# Patient Record
Sex: FEMALE | Race: WHITE | Employment: UNEMPLOYED | URBAN - METROPOLITAN AREA
[De-identification: names, ages, dates, MRNs, and addresses within clinical notes are randomized per-mention and may not be internally consistent; named-entity substitution may affect disease eponyms.]

---

## 2017-10-02 PROBLEM — Z3A.30 30 WEEKS GESTATION OF PREGNANCY: Status: ACTIVE | Noted: 2017-10-02

## 2017-10-02 PROBLEM — O35.HXX0 CLUB FOOT, FETAL, AFFECTING CARE OF MOTHER, ANTEPARTUM: Status: ACTIVE | Noted: 2017-10-02

## 2017-10-02 PROBLEM — F41.9 ANXIETY AND DEPRESSION: Status: ACTIVE | Noted: 2017-10-02

## 2017-10-02 PROBLEM — F32.A ANXIETY AND DEPRESSION: Status: ACTIVE | Noted: 2017-10-02

## 2017-10-02 PROBLEM — O99.810 ABNORMAL GLUCOSE IN PREGNANCY, ANTEPARTUM: Status: ACTIVE | Noted: 2017-10-02

## 2017-11-15 PROBLEM — Z3A.30 30 WEEKS GESTATION OF PREGNANCY: Status: RESOLVED | Noted: 2017-10-02 | Resolved: 2017-11-15

## 2017-11-15 PROBLEM — K83.1 CHOLESTASIS DURING PREGNANCY IN THIRD TRIMESTER: Status: ACTIVE | Noted: 2017-11-15

## 2017-11-15 PROBLEM — O99.013 MATERNAL ANEMIA IN PREGNANCY, ANTEPARTUM, THIRD TRIMESTER: Status: ACTIVE | Noted: 2017-11-15

## 2017-11-15 PROBLEM — O26.613 CHOLESTASIS DURING PREGNANCY IN THIRD TRIMESTER: Status: ACTIVE | Noted: 2017-11-15

## 2017-11-15 PROBLEM — Z3A.36 36 WEEKS GESTATION OF PREGNANCY: Status: ACTIVE | Noted: 2017-11-15

## 2017-11-27 PROBLEM — O26.93 COMPLICATED PREGNANCY, THIRD TRIMESTER: Status: ACTIVE | Noted: 2017-11-27

## 2019-01-09 ENCOUNTER — HOSPITAL ENCOUNTER (EMERGENCY)
Age: 29
Discharge: HOME | End: 2019-01-09
Payer: COMMERCIAL

## 2019-01-09 VITALS
SYSTOLIC BLOOD PRESSURE: 140 MMHG | HEART RATE: 82 BPM | TEMPERATURE: 97.8 F | WEIGHT: 130 LBS | OXYGEN SATURATION: 99 % | DIASTOLIC BLOOD PRESSURE: 86 MMHG | BODY MASS INDEX: 24.55 KG/M2 | HEIGHT: 61 IN | RESPIRATION RATE: 16 BRPM

## 2019-01-09 DIAGNOSIS — L02.91 ABSCESS: Primary | ICD-10-CM

## 2019-01-09 PROCEDURE — 99282 EMERGENCY DEPT VISIT SF MDM: CPT

## 2019-01-09 RX ORDER — DOXYCYCLINE 100 MG/1
100 CAPSULE ORAL 2 TIMES DAILY
Qty: 20 CAPSULE | Refills: 0 | Status: SHIPPED | OUTPATIENT
Start: 2019-01-09 | End: 2019-01-19

## 2019-01-09 ASSESSMENT — PAIN DESCRIPTION - LOCATION: LOCATION: FACE

## 2019-01-09 ASSESSMENT — PAIN DESCRIPTION - DESCRIPTORS: DESCRIPTORS: THROBBING

## 2019-01-09 ASSESSMENT — PAIN SCALES - GENERAL: PAINLEVEL_OUTOF10: 5

## 2019-03-27 ENCOUNTER — HOSPITAL ENCOUNTER (EMERGENCY)
Age: 29
Discharge: HOME | End: 2019-03-27
Payer: COMMERCIAL

## 2019-03-27 VITALS
WEIGHT: 125 LBS | DIASTOLIC BLOOD PRESSURE: 72 MMHG | BODY MASS INDEX: 23.6 KG/M2 | TEMPERATURE: 98.2 F | HEIGHT: 61 IN | HEART RATE: 74 BPM | RESPIRATION RATE: 16 BRPM | SYSTOLIC BLOOD PRESSURE: 112 MMHG | OXYGEN SATURATION: 96 %

## 2019-03-27 DIAGNOSIS — N39.0 URINARY TRACT INFECTION WITHOUT HEMATURIA, SITE UNSPECIFIED: Primary | ICD-10-CM

## 2019-03-27 DIAGNOSIS — R30.0 DYSURIA: ICD-10-CM

## 2019-03-27 LAB
BACTERIA: ABNORMAL /HPF
BILIRUBIN URINE: NEGATIVE
BLOOD, URINE: NEGATIVE
CLARITY: NORMAL
COLOR: YELLOW
EPITHELIAL CELLS, UA: ABNORMAL /HPF
GLUCOSE URINE: NEGATIVE MG/DL
KETONES, URINE: NEGATIVE MG/DL
LEUKOCYTE ESTERASE, URINE: NEGATIVE
NITRITE, URINE: NEGATIVE
PH UA: 6.5 (ref 5–9)
PROTEIN UA: NEGATIVE MG/DL
RBC UA: ABNORMAL /HPF (ref 0–2)
SPECIFIC GRAVITY UA: 1.02 (ref 1–1.03)
UROBILINOGEN, URINE: 0.2 E.U./DL
WBC UA: ABNORMAL /HPF (ref 0–5)

## 2019-03-27 PROCEDURE — 87088 URINE BACTERIA CULTURE: CPT

## 2019-03-27 PROCEDURE — 81001 URINALYSIS AUTO W/SCOPE: CPT

## 2019-03-27 PROCEDURE — 99283 EMERGENCY DEPT VISIT LOW MDM: CPT

## 2019-03-27 PROCEDURE — 6370000000 HC RX 637 (ALT 250 FOR IP): Performed by: NURSE PRACTITIONER

## 2019-03-27 RX ORDER — PHENAZOPYRIDINE HYDROCHLORIDE 100 MG/1
200 TABLET, FILM COATED ORAL ONCE
Status: COMPLETED | OUTPATIENT
Start: 2019-03-27 | End: 2019-03-27

## 2019-03-27 RX ORDER — PHENAZOPYRIDINE HYDROCHLORIDE 100 MG/1
100 TABLET, FILM COATED ORAL 3 TIMES DAILY PRN
Qty: 9 TABLET | Refills: 0 | Status: SHIPPED | OUTPATIENT
Start: 2019-03-27 | End: 2019-03-30

## 2019-03-27 RX ORDER — CEFUROXIME AXETIL 250 MG/1
250 TABLET ORAL 2 TIMES DAILY
Qty: 20 TABLET | Refills: 0 | Status: SHIPPED | OUTPATIENT
Start: 2019-03-27 | End: 2019-04-06

## 2019-03-27 RX ORDER — NAPROXEN 500 MG/1
500 TABLET ORAL 2 TIMES DAILY
Qty: 14 TABLET | Refills: 0 | Status: SHIPPED | OUTPATIENT
Start: 2019-03-27 | End: 2019-04-03

## 2019-03-27 RX ORDER — NAPROXEN 500 MG/1
500 TABLET ORAL ONCE
Status: COMPLETED | OUTPATIENT
Start: 2019-03-27 | End: 2019-03-27

## 2019-03-27 RX ADMIN — PHENAZOPYRIDINE HYDROCHLORIDE 200 MG: 100 TABLET ORAL at 12:36

## 2019-03-27 RX ADMIN — NAPROXEN 500 MG: 500 TABLET ORAL at 12:36

## 2019-03-27 ASSESSMENT — PAIN DESCRIPTION - ORIENTATION: ORIENTATION: MID

## 2019-03-27 ASSESSMENT — PAIN SCALES - WONG BAKER: WONGBAKER_NUMERICALRESPONSE: 4;6

## 2019-03-27 ASSESSMENT — PAIN DESCRIPTION - LOCATION: LOCATION: ABDOMEN

## 2019-03-27 ASSESSMENT — PAIN DESCRIPTION - DESCRIPTORS: DESCRIPTORS: BURNING

## 2019-03-27 ASSESSMENT — PAIN SCALES - GENERAL
PAINLEVEL_OUTOF10: 5
PAINLEVEL_OUTOF10: 5

## 2019-03-27 ASSESSMENT — PAIN DESCRIPTION - PAIN TYPE: TYPE: ACUTE PAIN

## 2019-03-27 ASSESSMENT — PAIN - FUNCTIONAL ASSESSMENT: PAIN_FUNCTIONAL_ASSESSMENT: ACTIVITIES ARE NOT PREVENTED

## 2019-03-27 ASSESSMENT — PAIN DESCRIPTION - FREQUENCY: FREQUENCY: INTERMITTENT

## 2019-03-29 LAB — URINE CULTURE, ROUTINE: NORMAL

## 2019-04-17 ENCOUNTER — HOSPITAL ENCOUNTER (EMERGENCY)
Age: 29
Discharge: HOME | End: 2019-04-17
Payer: COMMERCIAL

## 2019-04-17 VITALS
DIASTOLIC BLOOD PRESSURE: 77 MMHG | OXYGEN SATURATION: 99 % | HEIGHT: 61 IN | SYSTOLIC BLOOD PRESSURE: 125 MMHG | HEART RATE: 85 BPM | BODY MASS INDEX: 23.6 KG/M2 | RESPIRATION RATE: 16 BRPM | WEIGHT: 125 LBS | TEMPERATURE: 98.3 F

## 2019-04-17 DIAGNOSIS — J06.9 VIRAL URI WITH COUGH: Primary | ICD-10-CM

## 2019-04-17 LAB
INFLUENZA A BY PCR: NOT DETECTED
INFLUENZA B BY PCR: NOT DETECTED
STREP GRP A PCR: NEGATIVE

## 2019-04-17 PROCEDURE — 87502 INFLUENZA DNA AMP PROBE: CPT

## 2019-04-17 PROCEDURE — 99283 EMERGENCY DEPT VISIT LOW MDM: CPT

## 2019-04-17 PROCEDURE — 87880 STREP A ASSAY W/OPTIC: CPT

## 2019-04-17 PROCEDURE — 6370000000 HC RX 637 (ALT 250 FOR IP): Performed by: NURSE PRACTITIONER

## 2019-04-17 RX ORDER — FLUTICASONE PROPIONATE 50 MCG
1 SPRAY, SUSPENSION (ML) NASAL DAILY
Qty: 1 BOTTLE | Refills: 0 | Status: SHIPPED | OUTPATIENT
Start: 2019-04-17 | End: 2020-08-20

## 2019-04-17 RX ORDER — IBUPROFEN 600 MG/1
600 TABLET ORAL EVERY 6 HOURS PRN
Qty: 30 TABLET | Refills: 1 | Status: SHIPPED | OUTPATIENT
Start: 2019-04-17 | End: 2021-02-16

## 2019-04-17 RX ORDER — BROMPHENIRAMINE MALEATE, PSEUDOEPHEDRINE HYDROCHLORIDE, AND DEXTROMETHORPHAN HYDROBROMIDE 2; 30; 10 MG/5ML; MG/5ML; MG/5ML
5 SYRUP ORAL 4 TIMES DAILY PRN
Qty: 120 ML | Refills: 0 | Status: SHIPPED | OUTPATIENT
Start: 2019-04-17 | End: 2019-04-22

## 2019-04-17 RX ORDER — BROMPHENIRAMINE MALEATE, PSEUDOEPHEDRINE HYDROCHLORIDE, AND DEXTROMETHORPHAN HYDROBROMIDE 2; 30; 10 MG/5ML; MG/5ML; MG/5ML
5 SYRUP ORAL ONCE
Status: COMPLETED | OUTPATIENT
Start: 2019-04-17 | End: 2019-04-17

## 2019-04-17 RX ADMIN — BROMPHENIRAMINE MALEATE, PSEUDOEPHEDRINE HYDROCHLORIDE AND DEXTROMETHORPHAN HYDROBROMIDE 5 ML: 2; 30; 10 SYRUP ORAL at 18:12

## 2019-04-17 NOTE — ED PROVIDER NOTES
Department of Emergency Medicine   ED  Provider Note  Admit Date/RoomTime: 4/17/2019  5:14 PM  ED Room: 19/19  Chief Complaint:   URI    History of Present Illness   Source of history provided by:  patient. History/Exam Limitations: none. Steffi Owens is a 33 y.o. old female with a past medical history of:   Past Medical History:   Diagnosis Date    ADHD (attention deficit hyperactivity disorder)     Cholestasis during pregnancy in third trimester 11/15/2017    Depression     Diabetes mellitus ([facility] [address]     Maternal anemia in pregnancy, antepartum, third trimester 11/15/2017    presents to the emergency department by private vehicle, for fever, chills, nasal congestion and cough, which began 2 day(s) prior to arrival.  Since onset the symptoms have been persistent and mild-moderate in severity. The symptoms are associated with appetite decrease, earache and muscle aches. There has been NO abdominal pain, chest pain, diarrhea, dysuria, nausea, swollen glands or vomiting. ROS   Pertinent positives and negatives are stated within HPI, all other systems reviewed and are negative. Past Surgical History:  has no past surgical history on file. Social History:  reports that she has never smoked. She has never used smokeless tobacco. She reports that she does not drink alcohol or use drugs. Family History: family history is not on file. Allergies: Patient has no known allergies. Physical Exam           ED Triage Vitals [04/17/19 1712]   BP Temp Temp Source Pulse Resp SpO2 Height Weight   125/77 98.3 °F (36.8 °C) Oral 85 16 99 % 5' 1\" (1.549 m) 125 lb (56.7 kg)      Oxygen Saturation Interpretation: Normal.    · Constitutional:  Alert, development consistent with age. · Ears:  External Ears: Bilateral normal.               TM's & External Canals: normal TMs bilaterally. No bulging or erythema. · Nose:   There is clear rhinorrhea and no sinus tenderness.   · Sinuses: no Bilateral maxillary sinus tenderness. no Bilateral frontal sinus tenderness. · Mouth:  normal tongue and buccal mucosa. · Throat: mild erythema and 1-2+ tonsillar enlargement. No tonsillar concretions. No uvular deviation. Airway Patent. · Neck:  Supple. There is no  preauricular, submental, anterior cervical, posterior cervical and supraclavicular node tenderness. · Respiratory:   Breath sounds: Bilateral normal.  Lung sounds: clear without wheezing, rales, or rhonchi. · CV:  Regular rate and rhythm, normal heart sounds, without pathological murmurs, ectopy, gallops, or rubs. · GI:  Abdomen Soft, nontender, good bowel sounds. No firm or pulsatile mass. · Integument:  Normal turgor. Warm, dry, without visible rash. · Neurological:  Oriented. Motor functions intact. Lab / Imaging Results   (All laboratory and radiology results have been personally reviewed by myself)  Labs:  Results for orders placed or performed during the hospital encounter of 04/17/19   RAPID INFLUENZA A/B ANTIGENS   Result Value Ref Range    Influenza A by PCR Not Detected Not Detected    Influenza B by PCR Not Detected Not Detected   Strep Screen Group A Throat   Result Value Ref Range    Strep Grp A PCR Negative Negative       Imaging: All Radiology results interpreted by Radiologist unless otherwise noted. No orders to display       ED Course / Medical Decision Making     Medications   brompheniramine-pseudoephedrine-DM syrup 5 mL (5 mLs Oral Given 4/17/19 1812)     ED Course as of Apr 17 1844   Wed Apr 17, 2019   1825 Patient seen and reexamined the bedside. She missed to the flulike illness for the last 48 hours or so. Both other individuals in her house are sick with similar symptoms but all are admittedly mild. She has some nasal congestion, sinus congestion but no pain, postnasal drip and mild sore throat. She has no chest pain, has cough with scant sputum production but denies hemoptysis. Patient's symptoms are improving. Results are explained to patient in plain language, treatment plan reviewed, patient is agreeable with plan for discharge at this point. Understands that will need to call to arrange follow-up. [ZD]   1826 Oxygen Saturation Interpretation: Normal    Constitutional:  Alert, Well hydrated and well nourish, mildly ill-appearing, non-toxic and without distress. Eyes:  PERRL, EOMI, no discharge or conjunctival injection. Ears:  TMs without perforation, injection, or bulging. External canals clear without exudate. Nose:  There is mild mucosal erythema and clear nasal discharge, and no swelling or lesions present. Mouth:  Mucous membranes moist without lesions, tongue and gums normal.  Throat:  Pharynx without injection, exudate, or tonsillar hypertrophy. Uvula midline without edema or erythema. Airway patient. Neck:  Supple. No lymphadenopathy. No meningismus. Respiratory:  Clear to auscultation and breath sounds equal. Air entry normal.   CV:  Regular rate and rhythm, no murmer, gallups or rubs. .  GI:  Abdomen Soft, nontender, +BS. No rebound, guarding or rigidity, Suprapubic and CVA regions are nontender. Integument:  Normal turgor. Warm, dry, without visible rash, unless noted elsewhere. Lymphatic: no lymphadenopathy noted  Neurological:  Orientation age-appropriate unless noted elseware. Motor functions intact. [ZD]      ED Course User Index  [ZD] TAWANDA Richards Ala, CNP     Re-examination:  4/17/19       Time: 6:30 pm   Patient seen and reexamined the bedside. Patients symptoms are improving. Results are explained to patient in plain language, treatment plan reviewed, patient is agreeable with plan for discharge at this point. Understands that will need to call to arrange follow-up. Consults:   None    Procedures:   none    Medical Decision Making:    Based on low suspicion for pneumonia as per history/physical findings, imaging was not done.   Upper respiratory infection is likely to

## 2019-05-15 ENCOUNTER — HOSPITAL ENCOUNTER (EMERGENCY)
Age: 29
Discharge: HOME | End: 2019-05-15
Payer: COMMERCIAL

## 2019-05-15 VITALS
DIASTOLIC BLOOD PRESSURE: 60 MMHG | HEIGHT: 61 IN | OXYGEN SATURATION: 100 % | TEMPERATURE: 98.2 F | WEIGHT: 120 LBS | SYSTOLIC BLOOD PRESSURE: 164 MMHG | BODY MASS INDEX: 22.66 KG/M2 | HEART RATE: 107 BPM | RESPIRATION RATE: 16 BRPM

## 2019-05-15 DIAGNOSIS — R19.7 NAUSEA VOMITING AND DIARRHEA: Primary | ICD-10-CM

## 2019-05-15 DIAGNOSIS — R11.2 NAUSEA VOMITING AND DIARRHEA: Primary | ICD-10-CM

## 2019-05-15 LAB
ALBUMIN SERPL-MCNC: 4.3 G/DL (ref 3.5–5.2)
ALP BLD-CCNC: 57 U/L (ref 35–104)
ALT SERPL-CCNC: 18 U/L (ref 0–32)
ANION GAP SERPL CALCULATED.3IONS-SCNC: 12 MMOL/L (ref 7–16)
AST SERPL-CCNC: 27 U/L (ref 0–31)
BACTERIA: ABNORMAL /HPF
BASOPHILS ABSOLUTE: 0.02 E9/L (ref 0–0.2)
BASOPHILS RELATIVE PERCENT: 0.3 % (ref 0–2)
BILIRUB SERPL-MCNC: 0.4 MG/DL (ref 0–1.2)
BILIRUBIN URINE: NEGATIVE
BLOOD, URINE: ABNORMAL
BUN BLDV-MCNC: 9 MG/DL (ref 6–20)
CALCIUM SERPL-MCNC: 9.1 MG/DL (ref 8.6–10.2)
CHLORIDE BLD-SCNC: 102 MMOL/L (ref 98–107)
CLARITY: ABNORMAL
CO2: 21 MMOL/L (ref 22–29)
COLOR: YELLOW
CREAT SERPL-MCNC: 0.8 MG/DL (ref 0.5–1)
EOSINOPHILS ABSOLUTE: 0.03 E9/L (ref 0.05–0.5)
EOSINOPHILS RELATIVE PERCENT: 0.4 % (ref 0–6)
EPITHELIAL CELLS, UA: ABNORMAL /HPF
GFR AFRICAN AMERICAN: >60
GFR NON-AFRICAN AMERICAN: >60 ML/MIN/1.73
GLUCOSE BLD-MCNC: 100 MG/DL (ref 74–99)
GLUCOSE URINE: NEGATIVE MG/DL
HCG, URINE, POC: NEGATIVE
HCT VFR BLD CALC: 39.7 % (ref 34–48)
HEMOGLOBIN: 12.7 G/DL (ref 11.5–15.5)
IMMATURE GRANULOCYTES #: 0.02 E9/L
IMMATURE GRANULOCYTES %: 0.3 % (ref 0–5)
INFLUENZA A BY PCR: NOT DETECTED
INFLUENZA B BY PCR: NOT DETECTED
KETONES, URINE: ABNORMAL MG/DL
LACTIC ACID: 0.7 MMOL/L (ref 0.5–2.2)
LEUKOCYTE ESTERASE, URINE: NEGATIVE
LIPASE: 19 U/L (ref 13–60)
LYMPHOCYTES ABSOLUTE: 0.71 E9/L (ref 1.5–4)
LYMPHOCYTES RELATIVE PERCENT: 9.4 % (ref 20–42)
Lab: NORMAL
MCH RBC QN AUTO: 29.6 PG (ref 26–35)
MCHC RBC AUTO-ENTMCNC: 32 % (ref 32–34.5)
MCV RBC AUTO: 92.5 FL (ref 80–99.9)
MONOCYTES ABSOLUTE: 0.94 E9/L (ref 0.1–0.95)
MONOCYTES RELATIVE PERCENT: 12.4 % (ref 2–12)
NEGATIVE QC PASS/FAIL: NORMAL
NEUTROPHILS ABSOLUTE: 5.84 E9/L (ref 1.8–7.3)
NEUTROPHILS RELATIVE PERCENT: 77.2 % (ref 43–80)
NITRITE, URINE: NEGATIVE
PDW BLD-RTO: 13.1 FL (ref 11.5–15)
PH UA: 6 (ref 5–9)
PLATELET # BLD: 221 E9/L (ref 130–450)
PMV BLD AUTO: 10.4 FL (ref 7–12)
POSITIVE QC PASS/FAIL: NORMAL
POTASSIUM SERPL-SCNC: 4.1 MMOL/L (ref 3.5–5)
PROTEIN UA: 30 MG/DL
RBC # BLD: 4.29 E12/L (ref 3.5–5.5)
RBC UA: ABNORMAL /HPF (ref 0–2)
SODIUM BLD-SCNC: 135 MMOL/L (ref 132–146)
SPECIFIC GRAVITY UA: >=1.03 (ref 1–1.03)
TOTAL PROTEIN: 7.6 G/DL (ref 6.4–8.3)
UROBILINOGEN, URINE: 0.2 E.U./DL
WBC # BLD: 7.6 E9/L (ref 4.5–11.5)
WBC UA: ABNORMAL /HPF (ref 0–5)

## 2019-05-15 PROCEDURE — 99284 EMERGENCY DEPT VISIT MOD MDM: CPT

## 2019-05-15 PROCEDURE — 83605 ASSAY OF LACTIC ACID: CPT

## 2019-05-15 PROCEDURE — 83690 ASSAY OF LIPASE: CPT

## 2019-05-15 PROCEDURE — 85025 COMPLETE CBC W/AUTO DIFF WBC: CPT

## 2019-05-15 PROCEDURE — 6370000000 HC RX 637 (ALT 250 FOR IP): Performed by: PHYSICIAN ASSISTANT

## 2019-05-15 PROCEDURE — 80053 COMPREHEN METABOLIC PANEL: CPT

## 2019-05-15 PROCEDURE — 87502 INFLUENZA DNA AMP PROBE: CPT

## 2019-05-15 PROCEDURE — 81001 URINALYSIS AUTO W/SCOPE: CPT

## 2019-05-15 PROCEDURE — 36415 COLL VENOUS BLD VENIPUNCTURE: CPT

## 2019-05-15 RX ORDER — DICYCLOMINE HYDROCHLORIDE 10 MG/1
20 CAPSULE ORAL
Qty: 15 CAPSULE | Refills: 0 | Status: SHIPPED | OUTPATIENT
Start: 2019-05-15 | End: 2021-02-16

## 2019-05-15 RX ORDER — IBUPROFEN 600 MG/1
600 TABLET ORAL ONCE
Status: COMPLETED | OUTPATIENT
Start: 2019-05-15 | End: 2019-05-15

## 2019-05-15 RX ORDER — DICYCLOMINE HYDROCHLORIDE 10 MG/1
20 CAPSULE ORAL ONCE
Status: COMPLETED | OUTPATIENT
Start: 2019-05-15 | End: 2019-05-15

## 2019-05-15 RX ORDER — KETOROLAC TROMETHAMINE 30 MG/ML
30 INJECTION, SOLUTION INTRAMUSCULAR; INTRAVENOUS ONCE
Status: DISCONTINUED | OUTPATIENT
Start: 2019-05-15 | End: 2019-05-15

## 2019-05-15 RX ORDER — ONDANSETRON 4 MG/1
4 TABLET, ORALLY DISINTEGRATING ORAL ONCE
Status: COMPLETED | OUTPATIENT
Start: 2019-05-15 | End: 2019-05-15

## 2019-05-15 RX ORDER — ONDANSETRON 2 MG/ML
4 INJECTION INTRAMUSCULAR; INTRAVENOUS ONCE
Status: DISCONTINUED | OUTPATIENT
Start: 2019-05-15 | End: 2019-05-15

## 2019-05-15 RX ORDER — ONDANSETRON 4 MG/1
4 TABLET, ORALLY DISINTEGRATING ORAL EVERY 8 HOURS PRN
Qty: 10 TABLET | Refills: 0 | Status: SHIPPED | OUTPATIENT
Start: 2019-05-15 | End: 2020-08-20

## 2019-05-15 RX ORDER — 0.9 % SODIUM CHLORIDE 0.9 %
1000 INTRAVENOUS SOLUTION INTRAVENOUS ONCE
Status: DISCONTINUED | OUTPATIENT
Start: 2019-05-15 | End: 2019-05-15

## 2019-05-15 RX ADMIN — ONDANSETRON 4 MG: 4 TABLET, ORALLY DISINTEGRATING ORAL at 19:44

## 2019-05-15 RX ADMIN — DICYCLOMINE HYDROCHLORIDE 20 MG: 10 CAPSULE ORAL at 19:44

## 2019-05-15 RX ADMIN — IBUPROFEN 600 MG: 600 TABLET ORAL at 20:29

## 2019-05-15 ASSESSMENT — PAIN DESCRIPTION - PAIN TYPE: TYPE: ACUTE PAIN

## 2019-05-15 ASSESSMENT — PAIN SCALES - GENERAL: PAINLEVEL_OUTOF10: 5

## 2019-05-15 ASSESSMENT — PAIN DESCRIPTION - LOCATION: LOCATION: OTHER (COMMENT)

## 2019-05-15 NOTE — ED PROVIDER NOTES
[facility]  HPI:  5/15/19, Time: 7:00 PM         Norbetro Raya is a 33 y.o. female presenting to the ED for vomiting and diarrhea beginning 1 day  ago. The complaint has been persistent, moderate in severity, and worsened by nothing. Patient comes in with complaint of nausea vomiting diarrhea. She states she started with nausea vomiting yesterday had about 5 episodes today and she states she's had nausea but no vomiting has persistent diarrhea of watery diarrhea. She states she gets intermittent abdominal cramping prior to having diarrhea denies any fever chills. Denies any urinary frequency urgency burning. Patient's daughter was recently treated for vomiting diarrhea. Review of Systems:   Pertinent positives and negatives are stated within HPI, all other systems reviewed and are negative.          --------------------------------------------- PAST HISTORY ---------------------------------------------  Past Medical History:  has a past medical history of ADHD (attention deficit hyperactivity disorder), Cholestasis during pregnancy in third trimester, Depression, Diabetes mellitus ([facility] [address] and Maternal anemia in pregnancy, antepartum, third trimester. Past Surgical History:  has no past surgical history on file. Social History:  reports that she has never smoked. She has never used smokeless tobacco. She reports that she does not drink alcohol or use drugs. Family History: family history is not on file. The patients home medications have been reviewed. Allergies: Patient has no known allergies.     -------------------------------------------------- RESULTS -------------------------------------------------  All laboratory and radiology results have been personally reviewed by myself   LABS:  Results for orders placed or performed during the hospital encounter of 05/15/19   Rapid influenza A/B antigens   Result Value Ref Range    Influenza A by PCR Not Detected Not Detected    Influenza B by PCR tablet 600 mg (600 mg Oral Given 5/15/19 2029)         ED COURSE:     1920 patient refused IV. 2010 patient refuses CT abdomen. Medical Decision Making:    Vision came in with complaint of nausea vomiting diarrhea started yesterday. She has a daughter at home with similar symptoms. Patient's lab work showed normal white cell counts, normal electrolytes. Lactic acid is normal she states she gets some abdominal discomfort prior to the diarrhea. On exam she had mild suprapubic tenderness. Patient refused IV CT here during evaluation. Discussed with patient possibility of colitis. She had no right lower quadrant tenderness no guarding or rebound normal white cell count with no fever potential diagnosis of appendicitis is low. She states that she will return to the ER if symptoms worsen or persist. She was discharged with Bentyl Zofran. Patient slightly tachycardic. She refused IV fluids here on admission and urine showed trace ketones. Tachycardia likely due to dehydration. She is is tolerating oral hydration. Counseling: The emergency provider has spoken with the patient and discussed todays results, in addition to providing specific details for the plan of care and counseling regarding the diagnosis and prognosis. Questions are answered at this time and they are agreeable with the plan.      --------------------------------- IMPRESSION AND DISPOSITION ---------------------------------    IMPRESSION  1. Nausea vomiting and diarrhea        DISPOSITION  Disposition: Discharge to home  Patient condition is good      NOTE: This report was transcribed using voice recognition software.  Every effort was made to ensure accuracy; however, inadvertent computerized transcription errors may be present     Gwendolyn Bro, [address]  05/15/19 2654

## 2020-08-20 ENCOUNTER — HOSPITAL ENCOUNTER (EMERGENCY)
Age: 30
Discharge: HOME | End: 2020-08-20
Attending: FAMILY MEDICINE
Payer: COMMERCIAL

## 2020-08-20 VITALS
OXYGEN SATURATION: 98 % | WEIGHT: 135 LBS | TEMPERATURE: 97 F | BODY MASS INDEX: 25.51 KG/M2 | RESPIRATION RATE: 20 BRPM | HEART RATE: 130 BPM | DIASTOLIC BLOOD PRESSURE: 77 MMHG | SYSTOLIC BLOOD PRESSURE: 140 MMHG

## 2020-08-20 PROCEDURE — G0381 LEV 2 HOSP TYPE B ED VISIT: HCPCS

## 2020-08-20 RX ORDER — BROMPHENIRAMINE MALEATE, PSEUDOEPHEDRINE HYDROCHLORIDE, AND DEXTROMETHORPHAN HYDROBROMIDE 2; 30; 10 MG/5ML; MG/5ML; MG/5ML
5 SYRUP ORAL 4 TIMES DAILY PRN
Qty: 90 ML | Refills: 0 | Status: SHIPPED | OUTPATIENT
Start: 2020-08-20 | End: 2020-12-20

## 2020-08-20 RX ORDER — AMOXICILLIN 500 MG/1
500 CAPSULE ORAL 3 TIMES DAILY
Qty: 30 CAPSULE | Refills: 0 | Status: SHIPPED | OUTPATIENT
Start: 2020-08-20 | End: 2020-08-30

## 2020-08-20 NOTE — ED PROVIDER NOTES
HPI:  8/20/20,   Time: 6:17 PM EDT         Maryjane Carrasco is a 30 y.o. female presenting to the ED for a 2-week history of nasal congestion, yellow nasal discharge, facial pressure and nonproductive cough. She denies fever chills or body aches. She complains of bilateral ear pressure and popping. ROS:   Pertinent positives and negatives are stated within HPI, all other systems reviewed and are negative.  --------------------------------------------- PAST HISTORY ---------------------------------------------  Past Medical History:  has a past medical history of ADHD (attention deficit hyperactivity disorder), Cholestasis during pregnancy in third trimester, Depression, Diabetes mellitus ([facility] [address] and Maternal anemia in pregnancy, antepartum, third trimester. Past Surgical History:  has no past surgical history on file. Social History:  reports that she has never smoked. She has never used smokeless tobacco. She reports that she does not drink alcohol or use drugs. Family History: family history is not on file. The patients home medications have been reviewed. Allergies: Patient has no known allergies. -------------------------------------------------- RESULTS -------------------------------------------------  All laboratory and radiology results have been personally reviewed by myself   LABS:  No results found for this visit on 08/20/20. RADIOLOGY:  Interpreted by Radiologist.  No orders to display       ------------------------- NURSING NOTES AND VITALS REVIEWED ---------------------------   The nursing notes within the ED encounter and vital signs as below have been reviewed.    BP (!) 140/77   Pulse 130   Temp 97 °F (36.1 °C) (Skin)   Resp 20   Wt 135 lb (61.2 kg)   SpO2 98%   BMI 25.51 kg/m²   Oxygen Saturation Interpretation: Normal      ---------------------------------------------------PHYSICAL EXAM--------------------------------------    Constitutional/General: Alert and

## 2020-08-21 ENCOUNTER — CARE COORDINATION (OUTPATIENT)
Dept: CARE COORDINATION | Age: 30
End: 2020-08-21

## 2020-08-21 NOTE — CARE COORDINATION
ACM attempted to contact patient as a follow up to her ER visit on 08/20/20. ACM left a HIPAA compliant voice message with contact information asking patient to return the call.      PLAN:  D/T unable to contact patient, ACM will resolve COVID 19 Monitoring episode and remove name from the care team.

## 2020-12-20 ENCOUNTER — HOSPITAL ENCOUNTER (EMERGENCY)
Age: 30
Discharge: HOME | End: 2020-12-20
Payer: COMMERCIAL

## 2020-12-20 VITALS
SYSTOLIC BLOOD PRESSURE: 121 MMHG | TEMPERATURE: 98.2 F | OXYGEN SATURATION: 100 % | BODY MASS INDEX: 24.56 KG/M2 | DIASTOLIC BLOOD PRESSURE: 81 MMHG | WEIGHT: 130 LBS | RESPIRATION RATE: 16 BRPM | HEART RATE: 91 BPM

## 2020-12-20 PROCEDURE — 99212 OFFICE O/P EST SF 10 MIN: CPT

## 2020-12-20 RX ORDER — LEVONORGESTREL AND ETHINYL ESTRADIOL 0.15-0.03
1 KIT ORAL DAILY
COMMUNITY

## 2020-12-20 RX ORDER — AZITHROMYCIN 250 MG/1
TABLET, FILM COATED ORAL
Qty: 6 TABLET | Refills: 0 | Status: SHIPPED | OUTPATIENT
Start: 2020-12-20 | End: 2020-12-30

## 2020-12-20 RX ORDER — BROMPHENIRAMINE MALEATE, PSEUDOEPHEDRINE HYDROCHLORIDE, AND DEXTROMETHORPHAN HYDROBROMIDE 2; 30; 10 MG/5ML; MG/5ML; MG/5ML
5 SYRUP ORAL 4 TIMES DAILY PRN
Qty: 140 ML | Refills: 0 | Status: SHIPPED | OUTPATIENT
Start: 2020-12-20 | End: 2020-12-27

## 2020-12-20 ASSESSMENT — PAIN DESCRIPTION - DESCRIPTORS: DESCRIPTORS: SORE

## 2020-12-20 ASSESSMENT — PAIN DESCRIPTION - LOCATION: LOCATION: THROAT

## 2020-12-20 ASSESSMENT — PAIN SCALES - GENERAL: PAINLEVEL_OUTOF10: 5

## 2020-12-21 NOTE — ED PROVIDER NOTES
Department of Emergency [facility]  Provider Note  Admit Date/RoomTime: 12/20/2020  6:57 PM  Room: 04/04  Chief Complaint   Cough (productive cough x 1 week, chest hurts after coughing) and Pharyngitis (throat feels sore)    History of Present Illness   Source of history provided by:  Patient. History/Exam Limitations: None. Clive Eller is a 30 y.o. female with no significant medical history. She reports a 1 week history of nonproductive cough, congestion, sore throat, rhinorrhea, and malaise. Denies any chest pain. No shortness of breath. Daughter is ill with similar symptoms though apparently had otitis media. Denies any fevers, chills, or rigors. Denies any hemoptysis. Denies any abdominal pain or vomiting. No history of underlying cardiopulmonary disease. No exposures to any individuals diagnosed with COVID-19 or PUIs. No high risk domestic or international travel. Does not live or work in a group/institutionalized setting. ROS    Pertinent positives and negatives are stated within HPI, all other systems reviewed and are negative. History reviewed. No pertinent surgical history. Social History:  reports that she has never smoked. She has never used smokeless tobacco. She reports that she does not drink alcohol or use drugs. Family History: family history is not on file. Allergies: Patient has no known allergies. Physical Exam            ED Triage Vitals [12/20/20 1858]   BP Temp Temp Source Pulse Resp SpO2 Height Weight   121/81 98.2 °F (36.8 °C) Infrared 91 16 100 % -- 130 lb (59 kg)      Oxygen Saturation Interpretation: Normal.    Gen.: Vitals noted no distress. Afebrile. Normal phonation, no stridor, no trismus. Not hypoxemic. Clinically well-appearing. ENT: Appears well-hydrated. Posterior oropharynx is unremarkable. No exudate or asymmetry. Neck: Supple. No meningismus through full range of motion.  No anterior cervical or submandibular lymphadenopathy. No posterior lymphadenopathy. Cardiac: Regular rate rhythm no murmur. Lungs: Good aeration throughout. No adventitious breath sounds. Abdomen: Soft, nontender, nonsurgical throughout. Normoactive bowel sounds. Extremities: No peripheral edema, negative Homans bilaterally no cords. Skin: No rash. Neuro: No gross neurologic deficits. Lab / Imaging Results   (All laboratory and radiology results have been personally reviewed by myself)  Labs:  No results found for this visit on 12/20/20. Imaging: All Radiology results interpreted by Radiologist unless otherwise noted. No orders to display     ED Course / Medical Decision Making   Medications - No data to display       Differential Diagnosis: Is extensive but includes viral URI, COVID-19, community-acquired pneumonia, sepsis, cardiomyopathy, etc.    MDM:     This is a 30 y.o. female who presents with the above history. The patient has an unremarkable physical exam.  Centor score is 0/4. Agrees imaging is not indicated. Will be given Zithromax due to her longevity of symptoms as well as Bromfed. Discussed the very limited ability to obtain Covid testing at this facility. Is given a list of local testing centers where this can be obtained. Discussed at home quarantine and social distancing, etc. until resulted. Discussed appropriate return to work restrictions. The patient patient is clinically very well-appearing and nontoxic with no evidence of acute cardiopulmonary compromise. She notes that she is supposed to travel to [place] in the next few days and I advised her she should not do this until she is tested for Covid. Plan of Care: Normal progression of disease discussed. All questions answered. Explained symptomatic treatment. Instruction provided in the use of fluids, vaporizer, acetaminophen, and other OTC medication for symptom control. Extra fluids  Analgesics as needed, dose reviewed.   Follow up as needed should symptoms fail to improve. Counseling: Homegoing. I discussed the differential, results and discharge plan with the patient and/or family/friend/caregiver if present. I emphasized the importance of follow-up with the physician I referred them to in the timeframe recommended. I explained reasons for the patient to return to the Emergency Department. Additional verbal discharge instructions were also given and discussed with the patient to supplement those generated by the EMR. We also discussed medications that were prescribed (if any) including common side effects and interactions. The patient was advised to abstain from driving, operating heavy machinery or making significant decisions while taking medications such as opiates and muscle relaxers that may impair this. All questions were addressed. They understand return precautions and discharge instructions. The patient and/or family/friend/caregiver expressed understanding. Assessment      1. Acute upper respiratory infection      Plan   Discharge to home and advised to contact Vikram Humphrey MD  [address]       As needed   Patient condition is good    New Medications     New Prescriptions    AZITHROMYCIN (ZITHROMAX Z-CHESTER) 250 MG TABLET    Take 2 tabs on day one, followed by 1 tablet daily for 4 days. BROMPHENIRAMINE-PSEUDOEPHEDRINE-DM 2-30-10 MG/5ML SYRUP    Take 5 mLs by mouth 4 times daily as needed for Congestion or Cough     Electronically signed by MICHAEL Bernardo   DD: 12/20/20  **This report was transcribed using voice recognition software. Every effort was made to ensure accuracy; however, inadvertent computerized transcription errors may be present.   END OF ED PROVIDER NOTE        Isael Haskins [address] [place], [place]  12/20/20 2718

## 2021-02-16 ENCOUNTER — OFFICE VISIT (OUTPATIENT)
Dept: FAMILY MEDICINE CLINIC | Age: 31
End: 2021-02-16
Payer: COMMERCIAL

## 2021-02-16 VITALS
WEIGHT: 127 LBS | OXYGEN SATURATION: 98 % | DIASTOLIC BLOOD PRESSURE: 70 MMHG | SYSTOLIC BLOOD PRESSURE: 110 MMHG | TEMPERATURE: 98.7 F | HEART RATE: 90 BPM | BODY MASS INDEX: 23.98 KG/M2 | HEIGHT: 61 IN | RESPIRATION RATE: 16 BRPM

## 2021-02-16 DIAGNOSIS — Z86.32 HISTORY OF GESTATIONAL DIABETES: ICD-10-CM

## 2021-02-16 DIAGNOSIS — F31.9 BIPOLAR DEPRESSION (HCC): ICD-10-CM

## 2021-02-16 DIAGNOSIS — Z76.89 ENCOUNTER TO ESTABLISH CARE: Primary | ICD-10-CM

## 2021-02-16 DIAGNOSIS — F41.9 ANXIETY: ICD-10-CM

## 2021-02-16 PROBLEM — O35.HXX0 CLUB FOOT, FETAL, AFFECTING CARE OF MOTHER, ANTEPARTUM: Status: RESOLVED | Noted: 2017-10-02 | Resolved: 2021-02-16

## 2021-02-16 PROBLEM — K83.1 CHOLESTASIS DURING PREGNANCY IN THIRD TRIMESTER: Status: RESOLVED | Noted: 2017-11-15 | Resolved: 2021-02-16

## 2021-02-16 PROBLEM — O26.93 COMPLICATED PREGNANCY, THIRD TRIMESTER: Status: RESOLVED | Noted: 2017-11-27 | Resolved: 2021-02-16

## 2021-02-16 PROBLEM — O99.013 MATERNAL ANEMIA IN PREGNANCY, ANTEPARTUM, THIRD TRIMESTER: Status: RESOLVED | Noted: 2017-11-15 | Resolved: 2021-02-16

## 2021-02-16 PROBLEM — O99.810 ABNORMAL GLUCOSE IN PREGNANCY, ANTEPARTUM: Status: RESOLVED | Noted: 2017-10-02 | Resolved: 2021-02-16

## 2021-02-16 PROBLEM — O26.613 CHOLESTASIS DURING PREGNANCY IN THIRD TRIMESTER: Status: RESOLVED | Noted: 2017-11-15 | Resolved: 2021-02-16

## 2021-02-16 PROBLEM — Z3A.36 36 WEEKS GESTATION OF PREGNANCY: Status: RESOLVED | Noted: 2017-11-15 | Resolved: 2021-02-16

## 2021-02-16 PROCEDURE — 1036F TOBACCO NON-USER: CPT | Performed by: FAMILY MEDICINE

## 2021-02-16 PROCEDURE — G8420 CALC BMI NORM PARAMETERS: HCPCS | Performed by: FAMILY MEDICINE

## 2021-02-16 PROCEDURE — G8427 DOCREV CUR MEDS BY ELIG CLIN: HCPCS | Performed by: FAMILY MEDICINE

## 2021-02-16 PROCEDURE — 99203 OFFICE O/P NEW LOW 30 MIN: CPT | Performed by: FAMILY MEDICINE

## 2021-02-16 PROCEDURE — G8484 FLU IMMUNIZE NO ADMIN: HCPCS | Performed by: FAMILY MEDICINE

## 2021-02-16 ASSESSMENT — ENCOUNTER SYMPTOMS
SINUS PAIN: 0
DIARRHEA: 0
BACK PAIN: 0
SHORTNESS OF BREATH: 0
ABDOMINAL PAIN: 0
COUGH: 0
VOMITING: 0
RHINORRHEA: 0
CONSTIPATION: 0
SORE THROAT: 0
NAUSEA: 0

## 2021-02-16 NOTE — PROGRESS NOTES
2021    Chief Complaint   Patient presents with    New Patient     hasnt seen pcp in years per patient. was seeing [facility] at one time for bipolar. not currently seeing any specialist at present time    [address] recent pap a few weeks ago        Silvano Jaramillo (:  1990) is a 35 y.o. female, here for establishing care. They report a PMH of:  Past Medical History:   Diagnosis Date    ADHD (attention deficit hyperactivity disorder)     Cholestasis during pregnancy in third trimester 11/15/2017    Depression     DM (diabetes mellitus), gestational     Maternal anemia in pregnancy, antepartum, third trimester 11/15/2017     Social and family histories reviewed and updated as appropriate. She was not previously following with a PCP   She also follows with OB/GYN  She has previously seen psychiatry [facility] counseling)    Her only current medication is OCPs     She donates plasma twice weekly     She does have history history significant for gestational diabetes (diet-controlled)     She also reports a history of bipolar depression/anxiety for which she was previously seeing psychiatry. Previous treatment included medication and counseling    Overall, she feels well    Review of Systems   Constitutional: Negative for chills, fatigue and fever. HENT: Negative for congestion, rhinorrhea, sinus pain and sore throat. Respiratory: Negative for cough and shortness of breath. Cardiovascular: Negative for chest pain, palpitations and leg swelling. Gastrointestinal: Negative for abdominal pain, constipation, diarrhea, nausea and vomiting. Genitourinary: Negative for difficulty urinating. Musculoskeletal: Negative for arthralgias, back pain and gait problem. Skin: Negative for rash. Allergic/Immunologic: Negative for environmental allergies and food allergies. Neurological: Negative for dizziness, weakness and numbness.    Hematological: Does not bruise/bleed easily. Psychiatric/Behavioral: Negative for dysphoric mood. The patient is not nervous/anxious. Prior to Visit Medications    Medication Sig Taking? Authorizing Provider   levonorgestrel-ethinyl estradiol (SEASONALE) 0.15-0.03 MG per tablet Take 1 tablet by mouth daily Yes Historical Provider, MD        No Known Allergies    No past surgical history on file.     Social History     Socioeconomic History    Marital status: Single     Spouse name: Not on file    Number of children: Not on file    Years of education: Not on file    Highest education level: Not on file   Occupational History    Not on file   Social Needs    Financial resource strain: Not on file    Food insecurity     Worry: Not on file     Inability: Not on file    Transportation needs     Medical: Not on file     Non-medical: Not on file   Tobacco Use    Smoking status: Never Smoker    Smokeless tobacco: Never Used   Substance and Sexual Activity    Alcohol use: No    Drug use: No    Sexual activity: Yes     Partners: Male   Lifestyle    Physical activity     Days per week: Not on file     Minutes per session: Not on file    Stress: Not on file   Relationships    Social connections     Talks on phone: Not on file     Gets together: Not on file     Attends Gnosticist service: Not on file     Active member of club or organization: Not on file     Attends meetings of clubs or organizations: Not on file     Relationship status: Not on file    Intimate partner violence     Fear of current or ex partner: Not on file     Emotionally abused: Not on file     Physically abused: Not on file     Forced sexual activity: Not on file   Other Topics Concern    Not on file   Social History Narrative    Not on file        Family History   Problem Relation Age of Onset    High Blood Pressure Mother    Sherrine Temple Disease Mother     Cancer Father         lung and brain cancer age of death 64     Other Brother         alcoholic  Diabetes Maternal Aunt        Vitals:    02/16/21 1437   BP: 110/70   Pulse: 90   Resp: 16   Temp: 98.7 °F (37.1 °C)   TempSrc: Temporal   SpO2: 98%   Weight: 127 lb (57.6 kg)   Height: 5' 1\" (1.549 m)     Estimated body mass index is 24 kg/m² as calculated from the following:    Height as of this encounter: 5' 1\" (1.549 m). Weight as of this encounter: 127 lb (57.6 kg). Physical Exam  Constitutional:       General: She is not in acute distress. Appearance: She is well-developed. HENT:      Head: Normocephalic and atraumatic. Right Ear: External ear normal.      Left Ear: External ear normal.   Eyes:      Extraocular Movements: Extraocular movements intact. Pupils: Pupils are equal, round, and reactive to light. Neck:      Musculoskeletal: Normal range of motion. Thyroid: No thyromegaly. Cardiovascular:      Rate and Rhythm: Normal rate and regular rhythm. Pulmonary:      Effort: Pulmonary effort is normal. No respiratory distress. Breath sounds: Normal breath sounds. No wheezing or rales. Abdominal:      General: There is no distension. Musculoskeletal: Normal range of motion. General: No swelling or deformity. Skin:     General: Skin is warm. Findings: No rash. Neurological:      General: No focal deficit present. Mental Status: She is alert. Mental status is at baseline. Psychiatric:         Mood and Affect: Mood is anxious. Speech: Speech is tangential.         Behavior: Behavior is cooperative. ASSESSMENT/PLAN:  Brice Fitzgerald was seen today for new patient and health maintenance. Diagnoses and all orders for this visit:    Encounter to establish care    Anxiety    B[facility] ([facility]    History of gestational diabetes  -     CBC Auto Differential; Future  -     Comprehensive Metabolic Panel; Future  -     TSH; Future  -     HEMOGLOBIN A1C; Future    Additional plan and future considerations:   As above.   Records request. Encourage follow-up with psychiatry. Will notify of lab results via [address]   Return to office pending labs      --Marcelino Grant, DO on 2/16/2021 at 2:55 PM

## 2021-02-16 NOTE — PATIENT INSTRUCTIONS
Call [facility] to establish with a mental health provider regarding your anxiety and bipolar depression    [facility]  [address]  Phone: [phone]

## 2021-03-02 ENCOUNTER — HOSPITAL ENCOUNTER (EMERGENCY)
Age: 31
Discharge: HOME | End: 2021-03-02
Attending: EMERGENCY MEDICINE
Payer: COMMERCIAL

## 2021-03-02 VITALS
RESPIRATION RATE: 16 BRPM | OXYGEN SATURATION: 100 % | TEMPERATURE: 97.3 F | HEART RATE: 108 BPM | BODY MASS INDEX: 23.43 KG/M2 | SYSTOLIC BLOOD PRESSURE: 115 MMHG | WEIGHT: 124 LBS | DIASTOLIC BLOOD PRESSURE: 76 MMHG

## 2021-03-02 DIAGNOSIS — L02.91 ABSCESS: Primary | ICD-10-CM

## 2021-03-02 PROCEDURE — G0381 LEV 2 HOSP TYPE B ED VISIT: HCPCS

## 2021-03-02 RX ORDER — NAPROXEN 500 MG/1
500 TABLET ORAL 2 TIMES DAILY
Qty: 14 TABLET | Refills: 0 | Status: SHIPPED | OUTPATIENT
Start: 2021-03-02 | End: 2021-03-09

## 2021-03-02 RX ORDER — AMOXICILLIN AND CLAVULANATE POTASSIUM 875; 125 MG/1; MG/1
1 TABLET, FILM COATED ORAL 2 TIMES DAILY
Qty: 20 TABLET | Refills: 0 | Status: SHIPPED | OUTPATIENT
Start: 2021-03-02 | End: 2021-03-12

## 2021-03-02 ASSESSMENT — ENCOUNTER SYMPTOMS
VOMITING: 0
SINUS PRESSURE: 0
EYE PAIN: 0
ABDOMINAL DISTENTION: 0
BACK PAIN: 0
DIARRHEA: 0
COUGH: 0
SHORTNESS OF BREATH: 0
NAUSEA: 0
WHEEZING: 0
SORE THROAT: 0
EYE DISCHARGE: 0
EYE REDNESS: 0

## 2021-03-02 ASSESSMENT — PAIN DESCRIPTION - LOCATION: LOCATION: OTHER (COMMENT)

## 2021-03-02 ASSESSMENT — PAIN DESCRIPTION - DESCRIPTORS: DESCRIPTORS: SORE

## 2021-03-02 ASSESSMENT — PAIN DESCRIPTION - PROGRESSION: CLINICAL_PROGRESSION: NOT CHANGED

## 2021-03-02 ASSESSMENT — PAIN SCALES - GENERAL: PAINLEVEL_OUTOF10: 5

## 2021-03-02 NOTE — ED PROVIDER NOTES
Pimple on upper lip was squeezed and pierced with a pin at home;  Now painful, red, warm and increased in size    The history is provided by the patient. Abscess  Location:  Face  Facial abscess location:  Upper lip  Size:  1 x 2 cm  Abscess quality: induration, painful, redness and warmth    Red streaking: no    Progression:  Worsening  Pain details:     Quality:  Aching and burning    Severity:  Moderate    Progression:  Worsening  Chronicity:  New  Context: skin injury    Relieved by:  Nothing  Worsened by:  Draining/squeezing  Ineffective treatments:  Draining/squeezing  Associated symptoms: no fever, no headaches, no nausea and no vomiting         Review of Systems   Constitutional: Negative for chills and fever. HENT: Negative for ear pain, sinus pressure and sore throat. Eyes: Negative for pain, discharge and redness. Respiratory: Negative for cough, shortness of breath and wheezing. Cardiovascular: Negative for chest pain. Gastrointestinal: Negative for abdominal distention, diarrhea, nausea and vomiting. Genitourinary: Negative for dysuria and frequency. Musculoskeletal: Negative for arthralgias and back pain. Skin: Positive for wound. Negative for rash. Neurological: Negative for weakness and headaches. Hematological: Negative for adenopathy. Psychiatric/Behavioral: Negative. All other systems reviewed and are negative. Physical Exam  Vitals signs and nursing note reviewed. Constitutional:       Appearance: She is well-developed. HENT:      Head: Normocephalic and atraumatic. Eyes:      Pupils: Pupils are equal, round, and reactive to light. Neck:      Musculoskeletal: Normal range of motion and neck supple. Cardiovascular:      Rate and Rhythm: Normal rate and regular rhythm. Heart sounds: Normal heart sounds. No murmur. Pulmonary:      Effort: Pulmonary effort is normal.      Breath sounds: Normal breath sounds.    Abdominal:      General: Bowel sounds are normal.      Palpations: Abdomen is soft. Tenderness: There is no abdominal tenderness. There is no guarding or rebound. Skin:     General: Skin is warm and dry. Findings: Abscess present. Neurological:      Mental Status: She is alert and oriented to person, place, and time. Psychiatric:         Behavior: Behavior normal.         Thought Content: Thought content normal.         Judgment: Judgment normal.        --------------------------------------------- PAST HISTORY ---------------------------------------------  Past Medical History:  has a past medical history of Abnormal glucose in pregnancy, antepartum, ADHD (attention deficit hyperactivity disorder), Cholestasis during pregnancy in third trimester, Club foot, fetal, affecting care of mother, antepartum, Depression, DM (diabetes mellitus), gestational, and Maternal anemia in pregnancy, antepartum, third trimester. Past Surgical History:  has no past surgical history on file. Social History:  reports that she has never smoked. She has never used smokeless tobacco. She reports that she does not drink alcohol or use drugs. Family History: family history includes Cancer in her father; Diabetes in her maternal aunt; Graves Disease in her mother; High Blood Pressure in her mother; Other in her brother. The patients home medications have been reviewed. Allergies: Patient has no known allergies. -------------------------------------------------- RESULTS -------------------------------------------------  No results found for this visit on 03/02/21. No orders to display       ------------------------- NURSING NOTES AND VITALS REVIEWED ---------------------------   The nursing notes within the ED encounter and vital signs as below have been reviewed.    /76   Pulse 108   Temp 97.3 °F (36.3 °C) (Temporal)   Resp 16   Wt 124 lb (56.2 kg)   LMP 02/09/2021 (Approximate)   SpO2 100%   BMI 23.43 kg/m²   Oxygen Saturation Interpretation: Normal      ------------------------------------------ PROGRESS NOTES ------------------------------------------   I have spoken with the patient and discussed todays results, in addition to providing specific details for the plan of care and counseling regarding the diagnosis and prognosis. Their questions are answered at this time and they are agreeable with the plan.      --------------------------------- ADDITIONAL PROVIDER NOTES ---------------------------------        This patient is stable for discharge. I have shared the specific conditions for return, as well as the importance of follow-up. IMPRESSION:     1. Abscess      Patient's Medications   New Prescriptions    AMOXICILLIN-CLAVULANATE (AUGMENTIN) 875-125 MG PER TABLET    Take 1 tablet by mouth 2 times daily for 10 days    NAPROXEN (NAPROSYN) 500 MG TABLET    Take 1 tablet by mouth 2 times daily for 7 days   Previous Medications    LEVONORGESTREL-ETHINYL ESTRADIOL (SEASONALE) 0.15-0.03 MG PER TABLET    Take 1 tablet by mouth daily   Modified Medications    No medications on file   Discontinued Medications    NAPROXEN (NAPROSYN) 375 MG TABLET    Take 1 PO Q 8-12 hrs with food / meals for pain / swelling.     NAPROXEN (NAPROSYN) 500 MG TABLET    Take 1 tablet by mouth 2 times daily for 7 days         James Hardy DO  03/02/21 0636

## 2021-03-06 ENCOUNTER — HOSPITAL ENCOUNTER (EMERGENCY)
Age: 31
Discharge: HOME | End: 2021-03-06
Attending: FAMILY MEDICINE
Payer: COMMERCIAL

## 2021-03-06 VITALS
HEART RATE: 100 BPM | DIASTOLIC BLOOD PRESSURE: 87 MMHG | SYSTOLIC BLOOD PRESSURE: 141 MMHG | OXYGEN SATURATION: 99 % | WEIGHT: 127 LBS | HEIGHT: 62 IN | TEMPERATURE: 97 F | RESPIRATION RATE: 18 BRPM | BODY MASS INDEX: 23.37 KG/M2

## 2021-03-06 DIAGNOSIS — L02.01 FACIAL ABSCESS: Primary | ICD-10-CM

## 2021-03-06 PROCEDURE — G0381 LEV 2 HOSP TYPE B ED VISIT: HCPCS

## 2021-03-06 RX ORDER — AMOXICILLIN AND CLAVULANATE POTASSIUM 875; 125 MG/1; MG/1
1 TABLET, FILM COATED ORAL 2 TIMES DAILY
Qty: 20 TABLET | Refills: 0 | Status: SHIPPED | OUTPATIENT
Start: 2021-03-06 | End: 2021-03-16

## 2021-03-06 RX ORDER — SULFAMETHOXAZOLE AND TRIMETHOPRIM 800; 160 MG/1; MG/1
1 TABLET ORAL 2 TIMES DAILY
Qty: 20 TABLET | Refills: 0 | Status: SHIPPED | OUTPATIENT
Start: 2021-03-06 | End: 2021-03-16

## 2021-03-06 NOTE — ED PROVIDER NOTES
HPI:  3/6/21,   Time: 11:10 AM JESSICA Hdz is a 35 y.o. female presenting to the ED for a 1 week history of swelling of the upper lip, started out as a small pimple that one of her friends decided to try to squeeze and after that it progressively got larger and more swollen and started to drain yellowish and greenish pussy-like stuff spontaneously. She can squeeze stuff out of it. She was evaluated in this facility 5 days ago and was started on an antibiotic (Augmentin). Despite the treatment, the left eye remains swollen and painful. ROS:   Pertinent positives and negatives are stated within HPI, all other systems reviewed and are negative.  --------------------------------------------- PAST HISTORY ---------------------------------------------  Past Medical History:  has a past medical history of Abnormal glucose in pregnancy, antepartum, ADHD (attention deficit hyperactivity disorder), Cholestasis during pregnancy in third trimester, Club foot, fetal, affecting care of mother, antepartum, Depression, DM (diabetes mellitus), gestational, and Maternal anemia in pregnancy, antepartum, third trimester. Past Surgical History:  has no past surgical history on file. Social History:  reports that she has never smoked. She has never used smokeless tobacco. She reports that she does not drink alcohol or use drugs. Family History: family history includes Cancer in her father; Diabetes in her maternal aunt; Graves Disease in her mother; High Blood Pressure in her mother; Other in her brother. The patients home medications have been reviewed. Allergies: Patient has no known allergies. -------------------------------------------------- RESULTS -------------------------------------------------  All laboratory and radiology results have been personally reviewed by myself   LABS:  No results found for this visit on 03/06/21.     RADIOLOGY:  Interpreted by Radiologist.  No orders to display       ------------------------- NURSING NOTES AND VITALS REVIEWED ---------------------------   The nursing notes within the ED encounter and vital signs as below have been reviewed. BP (!) 141/87   Pulse 100   Temp 97 °F (36.1 °C) (Temporal)   Resp 18   Ht 5' 2\" (1.575 m)   Wt 127 lb (57.6 kg)   LMP 02/09/2021 (Approximate)   SpO2 99%   BMI 23.23 kg/m²   Oxygen Saturation Interpretation: Normal      ---------------------------------------------------PHYSICAL EXAM--------------------------------------    Constitutional/General: Alert and oriented x3, well appearing, non toxic in NAD  Head: NC/AT  Eyes: PERRL, EOMI  Mouth: Oropharynx clear, handling secretions, no trismus  Neck: Supple, full ROM, no meningeal signs  Pulmonary: Lungs clear to auscultation bilaterally, no wheezes, rales, or rhonchi. Not in respiratory distress  Cardiovascular:  Regular rate and rhythm, no murmurs, gallops, or rubs. 2+ distal pulses  Abdomen: Soft, non tender, non distended,   Extremities: Moves all extremities x 4. Warm and well perfused  Skin: The upper lip there is a swollen the midline and there is a yellowish/greenish crusty secretion seen and it the middle of the lip with some swelling, there is only mild erythema. Neurologic: GCS 15,  Psych: Normal Affect      ------------------------------ ED COURSE/MEDICAL DECISION MAKING----------------------  Medications - No data to display      Medical Decision Making:    Bactroban and bacitracin will be added to the patient's treatment regimen we will extend her Augmentin treatment for another 10 days. She is encouraged to follow-up if any worsening symptoms and she was discharged home in stable condition. Counseling: The emergency provider has spoken with the patient and discussed todays results, in addition to providing specific details for the plan of care and counseling regarding the diagnosis and prognosis.   Questions are answered at this time and they are

## 2021-03-07 NOTE — ED TRIAGE NOTES
Patient broke out in hives after starting the bactrim. Patient was told to stop the bactrim. Doxycycline was called in to [facility] pharmacy.

## 2023-05-10 ENCOUNTER — HOSPITAL ENCOUNTER (OUTPATIENT)
Age: 33
Discharge: HOME | End: 2023-05-10
Payer: COMMERCIAL

## 2023-05-10 LAB
ERYTHROCYTE [DISTWIDTH] IN BLOOD BY AUTOMATED COUNT: 12.9 FL (ref 11.5–15)
GLUCOSE TOLERANCE SCREEN 50G: 190 MG/DL (ref 70–140)
HCT VFR BLD AUTO: 33.4 % (ref 34–48)
HGB BLD-MCNC: 10.6 G/DL (ref 11.5–15.5)
MCH RBC QN AUTO: 30.6 PG (ref 26–35)
MCHC RBC AUTO-ENTMCNC: 31.7 % (ref 32–34.5)
MCV RBC AUTO: 96.5 FL (ref 80–99.9)
PLATELET # BLD AUTO: 213 E9/L (ref 130–450)
PMV BLD AUTO: 10.5 FL (ref 7–12)
RBC # BLD AUTO: 3.46 E12/L (ref 3.5–5.5)
WBC # BLD: 13.4 E9/L (ref 4.5–11.5)

## 2023-05-10 PROCEDURE — 36415 COLL VENOUS BLD VENIPUNCTURE: CPT

## 2023-05-10 PROCEDURE — 82950 GLUCOSE TEST: CPT

## 2023-05-10 PROCEDURE — 85027 COMPLETE CBC AUTOMATED: CPT

## 2023-05-24 ENCOUNTER — HOSPITAL ENCOUNTER (OUTPATIENT)
Age: 33
Discharge: HOME | End: 2023-05-24
Payer: COMMERCIAL

## 2023-05-24 LAB
GLUCOSE FASTING: 84 MG/ML
GLUCOSE TOLERANCE TEST 1 HOUR: 227 MG/DL
GLUCOSE TOLERANCE TEST 2 HOUR: 200 MG/DL
GLUCOSE TOLERANCE TEST 3 HOUR: 78 MG/DL

## 2023-05-24 PROCEDURE — 82952 GTT-ADDED SAMPLES: CPT

## 2023-05-24 PROCEDURE — 36415 COLL VENOUS BLD VENIPUNCTURE: CPT

## 2023-05-24 PROCEDURE — 82951 GLUCOSE TOLERANCE TEST (GTT): CPT

## 2023-06-19 ENCOUNTER — HOSPITAL ENCOUNTER (OUTPATIENT)
Age: 33
Discharge: HOME | End: 2023-06-19
Attending: OBSTETRICS & GYNECOLOGY | Admitting: OBSTETRICS & GYNECOLOGY
Payer: COMMERCIAL

## 2023-06-19 ENCOUNTER — APPOINTMENT (OUTPATIENT)
Dept: LABOR AND DELIVERY | Age: 33
End: 2023-06-19
Payer: COMMERCIAL

## 2023-06-19 VITALS
SYSTOLIC BLOOD PRESSURE: 124 MMHG | TEMPERATURE: 98.2 F | HEART RATE: 87 BPM | DIASTOLIC BLOOD PRESSURE: 80 MMHG | RESPIRATION RATE: 18 BRPM

## 2023-06-19 PROBLEM — Z3A.34 34 WEEKS GESTATION OF PREGNANCY: Status: ACTIVE | Noted: 2023-06-19

## 2023-06-19 PROCEDURE — 59025 FETAL NON-STRESS TEST: CPT

## 2023-06-23 ENCOUNTER — HOSPITAL ENCOUNTER (OUTPATIENT)
Age: 33
Discharge: HOME | End: 2023-06-23
Attending: OBSTETRICS & GYNECOLOGY | Admitting: OBSTETRICS & GYNECOLOGY
Payer: COMMERCIAL

## 2023-06-23 ENCOUNTER — APPOINTMENT (OUTPATIENT)
Dept: LABOR AND DELIVERY | Age: 33
End: 2023-06-23
Payer: COMMERCIAL

## 2023-06-23 VITALS
HEART RATE: 90 BPM | RESPIRATION RATE: 18 BRPM | TEMPERATURE: 98.1 F | SYSTOLIC BLOOD PRESSURE: 126 MMHG | DIASTOLIC BLOOD PRESSURE: 88 MMHG

## 2023-06-23 PROBLEM — Z34.93 NORMAL PREGNANCY, THIRD TRIMESTER: Status: ACTIVE | Noted: 2023-06-23

## 2023-06-23 PROCEDURE — 59025 FETAL NON-STRESS TEST: CPT

## 2023-06-23 NOTE — PROGRESS NOTES
35w1d  Patient on unit for Nst for GDM, pt reports no leaking, bleeding, or ctx. Pt placed on EFM and call light within reach.

## 2023-06-26 ENCOUNTER — APPOINTMENT (OUTPATIENT)
Dept: LABOR AND DELIVERY | Age: 33
End: 2023-06-26
Payer: COMMERCIAL

## 2023-06-26 ENCOUNTER — HOSPITAL ENCOUNTER (OUTPATIENT)
Age: 33
Discharge: HOME | End: 2023-06-26
Attending: OBSTETRICS & GYNECOLOGY | Admitting: OBSTETRICS & GYNECOLOGY
Payer: COMMERCIAL

## 2023-06-26 VITALS
TEMPERATURE: 97.9 F | HEART RATE: 96 BPM | SYSTOLIC BLOOD PRESSURE: 131 MMHG | DIASTOLIC BLOOD PRESSURE: 79 MMHG | RESPIRATION RATE: 18 BRPM

## 2023-06-26 PROBLEM — O26.93 COMPLICATED PREGNANCY, THIRD TRIMESTER: Status: ACTIVE | Noted: 2023-06-26

## 2023-06-26 PROCEDURE — 59025 FETAL NON-STRESS TEST: CPT

## 2023-06-30 ENCOUNTER — HOSPITAL ENCOUNTER (OUTPATIENT)
Age: 33
Discharge: HOME | End: 2023-06-30
Attending: OBSTETRICS & GYNECOLOGY | Admitting: OBSTETRICS & GYNECOLOGY
Payer: COMMERCIAL

## 2023-06-30 ENCOUNTER — APPOINTMENT (OUTPATIENT)
Dept: LABOR AND DELIVERY | Age: 33
End: 2023-06-30
Payer: COMMERCIAL

## 2023-06-30 VITALS
DIASTOLIC BLOOD PRESSURE: 67 MMHG | HEART RATE: 86 BPM | TEMPERATURE: 97.7 F | RESPIRATION RATE: 16 BRPM | SYSTOLIC BLOOD PRESSURE: 126 MMHG

## 2023-06-30 PROBLEM — O26.90 COMPLICATED PREGNANCY: Status: ACTIVE | Noted: 2023-06-30

## 2023-06-30 PROCEDURE — 59025 FETAL NON-STRESS TEST: CPT

## 2023-07-03 ENCOUNTER — APPOINTMENT (OUTPATIENT)
Dept: LABOR AND DELIVERY | Age: 33
End: 2023-07-03
Payer: COMMERCIAL

## 2023-07-03 ENCOUNTER — HOSPITAL ENCOUNTER (OUTPATIENT)
Age: 33
Discharge: HOME | End: 2023-07-03
Attending: OBSTETRICS & GYNECOLOGY | Admitting: OBSTETRICS & GYNECOLOGY
Payer: COMMERCIAL

## 2023-07-03 VITALS
RESPIRATION RATE: 16 BRPM | DIASTOLIC BLOOD PRESSURE: 78 MMHG | TEMPERATURE: 98 F | SYSTOLIC BLOOD PRESSURE: 123 MMHG | HEART RATE: 94 BPM

## 2023-07-03 PROBLEM — Z3A.36 36 WEEKS GESTATION OF PREGNANCY: Status: ACTIVE | Noted: 2023-07-03

## 2023-07-03 PROCEDURE — 59025 FETAL NON-STRESS TEST: CPT

## 2023-07-07 ENCOUNTER — APPOINTMENT (OUTPATIENT)
Dept: LABOR AND DELIVERY | Age: 33
End: 2023-07-07
Payer: COMMERCIAL

## 2023-07-07 ENCOUNTER — HOSPITAL ENCOUNTER (OUTPATIENT)
Age: 33
Discharge: HOME | End: 2023-07-07
Attending: OBSTETRICS & GYNECOLOGY | Admitting: OBSTETRICS & GYNECOLOGY
Payer: COMMERCIAL

## 2023-07-07 PROCEDURE — 59025 FETAL NON-STRESS TEST: CPT

## 2023-07-10 ENCOUNTER — APPOINTMENT (OUTPATIENT)
Dept: LABOR AND DELIVERY | Age: 33
End: 2023-07-10
Payer: COMMERCIAL

## 2023-07-10 ENCOUNTER — HOSPITAL ENCOUNTER (OUTPATIENT)
Age: 33
Discharge: HOME | End: 2023-07-10
Attending: OBSTETRICS & GYNECOLOGY | Admitting: OBSTETRICS & GYNECOLOGY
Payer: COMMERCIAL

## 2023-07-10 VITALS
DIASTOLIC BLOOD PRESSURE: 85 MMHG | TEMPERATURE: 98.1 F | RESPIRATION RATE: 18 BRPM | SYSTOLIC BLOOD PRESSURE: 130 MMHG | HEART RATE: 80 BPM

## 2023-07-10 PROCEDURE — 59025 FETAL NON-STRESS TEST: CPT

## 2023-07-10 NOTE — PROGRESS NOTES
Diagnosis:   Gestational Diabetes     Result:  Reactive       Plan:  Discharge home    F/U as scheduled      Chandrakantrocky Islas MD

## 2023-07-10 NOTE — PROGRESS NOTES
37w4d  [facility] 23 ambulatory to L&D for scheduled NST for GDM. EFM applied. Maternal perception of fetal movement noted. Denies bleeding, leaking of fluid, or contractions. Call light in reach.

## 2023-07-14 ENCOUNTER — APPOINTMENT (OUTPATIENT)
Dept: LABOR AND DELIVERY | Age: 33
End: 2023-07-14
Payer: COMMERCIAL

## 2023-07-14 ENCOUNTER — HOSPITAL ENCOUNTER (OUTPATIENT)
Age: 33
Discharge: HOME | End: 2023-07-14
Attending: OBSTETRICS & GYNECOLOGY | Admitting: OBSTETRICS & GYNECOLOGY
Payer: COMMERCIAL

## 2023-07-14 VITALS — TEMPERATURE: 98.4 F | DIASTOLIC BLOOD PRESSURE: 86 MMHG | HEART RATE: 82 BPM | SYSTOLIC BLOOD PRESSURE: 126 MMHG

## 2023-07-14 PROBLEM — Z3A.38 38 WEEKS GESTATION OF PREGNANCY: Status: ACTIVE | Noted: 2023-07-14

## 2023-07-14 PROCEDURE — 59025 FETAL NON-STRESS TEST: CPT

## 2023-07-14 NOTE — PROGRESS NOTES
G 2 P 1   38.1 week   Pt of Dr. Brielle Saunders  Arrived ambulatory to unit for scheduled NST due to GDM  Pt denies any bleeding or leaking of fluid, denies contractions. Pt stated that she perceives fetal movement and has not had any other complications with pregnancy. Test discussed with pt who verbalized understanding.

## 2023-07-17 ENCOUNTER — APPOINTMENT (OUTPATIENT)
Dept: LABOR AND DELIVERY | Age: 33
End: 2023-07-17
Payer: COMMERCIAL

## 2023-07-17 ENCOUNTER — HOSPITAL ENCOUNTER (OUTPATIENT)
Age: 33
Discharge: HOME | End: 2023-07-17
Attending: OBSTETRICS & GYNECOLOGY | Admitting: OBSTETRICS & GYNECOLOGY
Payer: COMMERCIAL

## 2023-07-17 VITALS
HEART RATE: 88 BPM | RESPIRATION RATE: 18 BRPM | SYSTOLIC BLOOD PRESSURE: 131 MMHG | DIASTOLIC BLOOD PRESSURE: 85 MMHG | TEMPERATURE: 98.4 F

## 2023-07-17 PROCEDURE — 59025 FETAL NON-STRESS TEST: CPT

## 2023-07-17 NOTE — PROGRESS NOTES
Patient arrived ambulatory for NST due to GDM. , GA 38w4d, KAYCEE 2023. VS taken and EFM applied. Patient reports active fetal movement, no pain/problems urinating, and no leakage of vaginal fluids/vaginal bleeding. NST explained to patient.

## 2023-07-17 NOTE — PROGRESS NOTES
Emailed NST to Dr. Brielle Saunders.  Dr. Brielle Saunders telephoned back and said Reactive NST, patient can go home.

## 2023-07-21 ENCOUNTER — APPOINTMENT (OUTPATIENT)
Dept: LABOR AND DELIVERY | Age: 33
End: 2023-07-21
Payer: COMMERCIAL

## 2023-07-21 ENCOUNTER — HOSPITAL ENCOUNTER (OUTPATIENT)
Age: 33
Discharge: HOME | End: 2023-07-21
Attending: OBSTETRICS & GYNECOLOGY | Admitting: OBSTETRICS & GYNECOLOGY
Payer: COMMERCIAL

## 2023-07-21 PROBLEM — O26.90 PREGNANCY, COMPLICATED: Status: ACTIVE | Noted: 2023-07-21

## 2023-07-21 PROCEDURE — 59025 FETAL NON-STRESS TEST: CPT

## 2023-07-21 NOTE — PROGRESS NOTES
GP: 2/1    EDC/WEEKS:39w1d 7/27/2023    Here for NST today for GDM    Patient placed on EFM where audible fetal movement noted by RN and positively perceived by patient. She denies LOF/Vaginal bleeding and reports good health. Patient understanding of testing, call light given.

## 2023-07-21 NOTE — PROGRESS NOTES
Dr. Azam Villalpando covering for Dr. Browning Royalty. Dr. Azam Villalpando updated on patient vitals, FHR baseline, 1 contraction ( patient unaware), And reactive tracing. Patient cleared for discharge.

## 2023-07-24 ENCOUNTER — HOSPITAL ENCOUNTER (OUTPATIENT)
Age: 33
Discharge: HOME | End: 2023-07-24
Attending: OBSTETRICS & GYNECOLOGY | Admitting: OBSTETRICS & GYNECOLOGY
Payer: COMMERCIAL

## 2023-07-24 ENCOUNTER — APPOINTMENT (OUTPATIENT)
Dept: LABOR AND DELIVERY | Age: 33
End: 2023-07-24
Payer: COMMERCIAL

## 2023-07-24 PROBLEM — Z36.89 NST (NON-STRESS TEST) REACTIVE: Status: ACTIVE | Noted: 2023-07-24

## 2023-07-24 PROCEDURE — 59025 FETAL NON-STRESS TEST: CPT

## 2023-07-24 NOTE — PROGRESS NOTES
Diagnosis:   Gestational diabetes    Result:  Reactive       Plan:  Discharge home    F/U as scheduled      Joss Vera MD

## 2023-07-24 NOTE — PROGRESS NOTES
Dr. Teodora Gallardo on unit and evaluating NST. Aware of complaints. [facility] for discharge with kick count instructions.

## 2023-07-24 NOTE — PROGRESS NOTES
Kick count instructions given to patient. Pt verbalized understanding and had opportunity to ask questions.

## 2023-07-24 NOTE — PROGRESS NOTES
, 39 . Pt arrived to unit for scheduled NST. Pt denies any leaking of fluid or bleeding. Pt states occasional cramping and states she has not perceived fetal movement within the last day. EFM established.

## 2023-07-28 ENCOUNTER — APPOINTMENT (OUTPATIENT)
Dept: LABOR AND DELIVERY | Age: 33
End: 2023-07-28
Payer: COMMERCIAL

## 2023-07-28 ENCOUNTER — HOSPITAL ENCOUNTER (OUTPATIENT)
Age: 33
Discharge: HOME | End: 2023-07-28
Attending: OBSTETRICS & GYNECOLOGY | Admitting: OBSTETRICS & GYNECOLOGY
Payer: COMMERCIAL

## 2023-07-28 VITALS
RESPIRATION RATE: 18 BRPM | DIASTOLIC BLOOD PRESSURE: 81 MMHG | TEMPERATURE: 97.8 F | HEART RATE: 84 BPM | SYSTOLIC BLOOD PRESSURE: 131 MMHG

## 2023-07-28 PROBLEM — Z3A.40 40 WEEKS GESTATION OF PREGNANCY: Status: ACTIVE | Noted: 2023-07-28

## 2023-07-28 PROCEDURE — 59025 FETAL NON-STRESS TEST: CPT

## 2023-08-01 ENCOUNTER — ANESTHESIA EVENT (OUTPATIENT)
Dept: LABOR AND DELIVERY | Age: 33
End: 2023-08-01
Payer: COMMERCIAL

## 2023-08-02 ENCOUNTER — HOSPITAL ENCOUNTER (INPATIENT)
Age: 33
LOS: 3 days | Discharge: HOME | End: 2023-08-05
Attending: OBSTETRICS & GYNECOLOGY | Admitting: OBSTETRICS & GYNECOLOGY
Payer: COMMERCIAL

## 2023-08-02 ENCOUNTER — ANESTHESIA (OUTPATIENT)
Dept: LABOR AND DELIVERY | Age: 33
End: 2023-08-02
Payer: COMMERCIAL

## 2023-08-02 PROBLEM — O26.90 COMPLICATED PREGNANCY, UNSPECIFIED TRIMESTER: Status: RESOLVED | Noted: 2023-08-02 | Resolved: 2023-08-02

## 2023-08-02 PROBLEM — O26.90 PREGNANCY, COMPLICATED: Status: RESOLVED | Noted: 2023-07-21 | Resolved: 2023-08-02

## 2023-08-02 PROBLEM — O34.211 MATERNAL CARE DUE TO LOW TRANSVERSE UTERINE SCAR FROM PREVIOUS CESAREAN DELIVERY: Status: ACTIVE | Noted: 2023-08-02

## 2023-08-02 PROBLEM — O26.90 COMPLICATED PREGNANCY: Status: RESOLVED | Noted: 2023-06-30 | Resolved: 2023-08-02

## 2023-08-02 PROBLEM — Z3A.38 38 WEEKS GESTATION OF PREGNANCY: Status: RESOLVED | Noted: 2023-07-14 | Resolved: 2023-08-02

## 2023-08-02 PROBLEM — Z3A.34 34 WEEKS GESTATION OF PREGNANCY: Status: RESOLVED | Noted: 2023-06-19 | Resolved: 2023-08-02

## 2023-08-02 PROBLEM — Z34.93 NORMAL PREGNANCY, THIRD TRIMESTER: Status: RESOLVED | Noted: 2023-06-23 | Resolved: 2023-08-02

## 2023-08-02 PROBLEM — O26.90 COMPLICATED PREGNANCY, UNSPECIFIED TRIMESTER: Status: ACTIVE | Noted: 2023-08-02

## 2023-08-02 PROBLEM — O26.93 COMPLICATED PREGNANCY, THIRD TRIMESTER: Status: RESOLVED | Noted: 2023-06-26 | Resolved: 2023-08-02

## 2023-08-02 PROBLEM — Z3A.39 39 WEEKS GESTATION OF PREGNANCY: Status: ACTIVE | Noted: 2023-07-28

## 2023-08-02 PROBLEM — Z3A.36 36 WEEKS GESTATION OF PREGNANCY: Status: RESOLVED | Noted: 2023-07-03 | Resolved: 2023-08-02

## 2023-08-02 LAB
ABO + RH BLD: NORMAL
ALBUMIN SERPL-MCNC: 3.4 G/DL (ref 3.5–5.2)
ALP SERPL-CCNC: 192 U/L (ref 35–104)
ALT SERPL-CCNC: 25 U/L (ref 0–32)
AMPHET UR QL SCN: NEGATIVE
ANION GAP SERPL CALCULATED.3IONS-SCNC: 12 MMOL/L (ref 7–16)
ARM BAND NUMBER: NORMAL
AST SERPL-CCNC: 39 U/L (ref 0–31)
BARBITURATES UR QL SCN: NEGATIVE
BENZODIAZ UR QL: NEGATIVE
BILIRUB SERPL-MCNC: 0.4 MG/DL (ref 0–1.2)
BLOOD BANK SAMPLE EXPIRATION: NORMAL
BLOOD GROUP ANTIBODIES SERPL: NEGATIVE
BUN SERPL-MCNC: 12 MG/DL (ref 6–20)
BUPRENORPHINE UR QL: NEGATIVE
CALCIUM SERPL-MCNC: 9.4 MG/DL (ref 8.6–10.2)
CANNABINOIDS UR QL SCN: NEGATIVE
CHLORIDE SERPL-SCNC: 103 MMOL/L (ref 98–107)
CO2 SERPL-SCNC: 20 MMOL/L (ref 22–29)
COCAINE UR QL SCN: NEGATIVE
CREAT SERPL-MCNC: 0.7 MG/DL (ref 0.5–1)
ERYTHROCYTE [DISTWIDTH] IN BLOOD BY AUTOMATED COUNT: 14.7 % (ref 11.5–15)
FENTANYL UR QL: NEGATIVE
GFR SERPL CREATININE-BSD FRML MDRD: >60 ML/MIN/1.73M2
GLUCOSE SERPL-MCNC: 74 MG/DL (ref 74–99)
HCT VFR BLD AUTO: 33.3 % (ref 34–48)
HGB BLD-MCNC: 11 G/DL (ref 11.5–15.5)
MCH RBC QN AUTO: 28.4 PG (ref 26–35)
MCHC RBC AUTO-ENTMCNC: 33 G/DL (ref 32–34.5)
MCV RBC AUTO: 86 FL (ref 80–99.9)
METHADONE UR QL: NEGATIVE
OPIATES UR QL SCN: NEGATIVE
OXYCODONE UR QL SCN: NEGATIVE
PCP UR QL SCN: NEGATIVE
PLATELET # BLD AUTO: 218 K/UL (ref 130–450)
PMV BLD AUTO: 12 FL (ref 7–12)
POTASSIUM SERPL-SCNC: 4.2 MMOL/L (ref 3.5–5)
PROT SERPL-MCNC: 6.6 G/DL (ref 6.4–8.3)
RBC # BLD AUTO: 3.87 M/UL (ref 3.5–5.5)
SODIUM SERPL-SCNC: 135 MMOL/L (ref 132–146)
TEST INFORMATION: NORMAL
WBC OTHER # BLD: 14.7 K/UL (ref 4.5–11.5)

## 2023-08-02 PROCEDURE — 1220000001 HC SEMI PRIVATE L&D R&B

## 2023-08-02 PROCEDURE — 3700000001 HC ADD 15 MINUTES (ANESTHESIA): Performed by: OBSTETRICS & GYNECOLOGY

## 2023-08-02 PROCEDURE — 80307 DRUG TEST PRSMV CHEM ANLYZR: CPT

## 2023-08-02 PROCEDURE — 86901 BLOOD TYPING SEROLOGIC RH(D): CPT

## 2023-08-02 PROCEDURE — 2709999900 HC NON-CHARGEABLE SUPPLY: Performed by: OBSTETRICS & GYNECOLOGY

## 2023-08-02 PROCEDURE — 2580000003 HC RX 258: Performed by: OBSTETRICS & GYNECOLOGY

## 2023-08-02 PROCEDURE — 3E033VJ INTRODUCTION OF OTHER HORMONE INTO PERIPHERAL VEIN, PERCUTANEOUS APPROACH: ICD-10-PCS | Performed by: OBSTETRICS & GYNECOLOGY

## 2023-08-02 PROCEDURE — 86592 SYPHILIS TEST NON-TREP QUAL: CPT

## 2023-08-02 PROCEDURE — 80053 COMPREHEN METABOLIC PANEL: CPT

## 2023-08-02 PROCEDURE — 6360000002 HC RX W HCPCS: Performed by: NURSE ANESTHETIST, CERTIFIED REGISTERED

## 2023-08-02 PROCEDURE — 3609079900 HC CESAREAN SECTION: Performed by: OBSTETRICS & GYNECOLOGY

## 2023-08-02 PROCEDURE — 85027 COMPLETE CBC AUTOMATED: CPT

## 2023-08-02 PROCEDURE — 86900 BLOOD TYPING SEROLOGIC ABO: CPT

## 2023-08-02 PROCEDURE — 7100000001 HC PACU RECOVERY - ADDTL 15 MIN: Performed by: OBSTETRICS & GYNECOLOGY

## 2023-08-02 PROCEDURE — 2500000003 HC RX 250 WO HCPCS: Performed by: NURSE ANESTHETIST, CERTIFIED REGISTERED

## 2023-08-02 PROCEDURE — 7100000000 HC PACU RECOVERY - FIRST 15 MIN: Performed by: OBSTETRICS & GYNECOLOGY

## 2023-08-02 PROCEDURE — 6360000002 HC RX W HCPCS: Performed by: ANESTHESIOLOGY

## 2023-08-02 PROCEDURE — 36415 COLL VENOUS BLD VENIPUNCTURE: CPT

## 2023-08-02 PROCEDURE — 6360000002 HC RX W HCPCS

## 2023-08-02 PROCEDURE — 6360000002 HC RX W HCPCS: Performed by: OBSTETRICS & GYNECOLOGY

## 2023-08-02 PROCEDURE — 6370000000 HC RX 637 (ALT 250 FOR IP): Performed by: OBSTETRICS & GYNECOLOGY

## 2023-08-02 PROCEDURE — 3700000000 HC ANESTHESIA ATTENDED CARE: Performed by: OBSTETRICS & GYNECOLOGY

## 2023-08-02 PROCEDURE — 86850 RBC ANTIBODY SCREEN: CPT

## 2023-08-02 RX ORDER — SODIUM CHLORIDE 0.9 % (FLUSH) 0.9 %
5-40 SYRINGE (ML) INJECTION EVERY 12 HOURS SCHEDULED
Status: DISCONTINUED | OUTPATIENT
Start: 2023-08-02 | End: 2023-08-05 | Stop reason: HOSPADM

## 2023-08-02 RX ORDER — HYDROMORPHONE HYDROCHLORIDE 1 MG/ML
0.5 INJECTION, SOLUTION INTRAMUSCULAR; INTRAVENOUS; SUBCUTANEOUS EVERY 5 MIN PRN
Status: DISCONTINUED | OUTPATIENT
Start: 2023-08-02 | End: 2023-08-05 | Stop reason: HOSPADM

## 2023-08-02 RX ORDER — SODIUM CHLORIDE 9 MG/ML
INJECTION, SOLUTION INTRAVENOUS PRN
Status: DISCONTINUED | OUTPATIENT
Start: 2023-08-02 | End: 2023-08-05 | Stop reason: HOSPADM

## 2023-08-02 RX ORDER — SODIUM CHLORIDE 0.9 % (FLUSH) 0.9 %
5-40 SYRINGE (ML) INJECTION PRN
Status: DISCONTINUED | OUTPATIENT
Start: 2023-08-02 | End: 2023-08-05 | Stop reason: HOSPADM

## 2023-08-02 RX ORDER — LABETALOL HYDROCHLORIDE 5 MG/ML
20 INJECTION, SOLUTION INTRAVENOUS
Status: COMPLETED | OUTPATIENT
Start: 2023-08-02 | End: 2023-08-02

## 2023-08-02 RX ORDER — KETOROLAC TROMETHAMINE 30 MG/ML
30 INJECTION, SOLUTION INTRAMUSCULAR; INTRAVENOUS ONCE
Status: DISCONTINUED | OUTPATIENT
Start: 2023-08-02 | End: 2023-08-05 | Stop reason: HOSPADM

## 2023-08-02 RX ORDER — TRISODIUM CITRATE DIHYDRATE AND CITRIC ACID MONOHYDRATE 500; 334 MG/5ML; MG/5ML
30 SOLUTION ORAL ONCE
Status: COMPLETED | OUTPATIENT
Start: 2023-08-02 | End: 2023-08-02

## 2023-08-02 RX ORDER — SODIUM CHLORIDE, SODIUM LACTATE, POTASSIUM CHLORIDE, CALCIUM CHLORIDE 600; 310; 30; 20 MG/100ML; MG/100ML; MG/100ML; MG/100ML
INJECTION, SOLUTION INTRAVENOUS CONTINUOUS
Status: DISCONTINUED | OUTPATIENT
Start: 2023-08-02 | End: 2023-08-05 | Stop reason: HOSPADM

## 2023-08-02 RX ORDER — SODIUM CHLORIDE, SODIUM LACTATE, POTASSIUM CHLORIDE, CALCIUM CHLORIDE 600; 310; 30; 20 MG/100ML; MG/100ML; MG/100ML; MG/100ML
INJECTION, SOLUTION INTRAVENOUS CONTINUOUS
Status: DISCONTINUED | OUTPATIENT
Start: 2023-08-02 | End: 2023-08-02

## 2023-08-02 RX ORDER — KETOROLAC TROMETHAMINE 30 MG/ML
30 INJECTION, SOLUTION INTRAMUSCULAR; INTRAVENOUS
Status: DISCONTINUED | OUTPATIENT
Start: 2023-08-02 | End: 2023-08-02

## 2023-08-02 RX ORDER — ROCURONIUM BROMIDE 10 MG/ML
INJECTION, SOLUTION INTRAVENOUS PRN
Status: DISCONTINUED | OUTPATIENT
Start: 2023-08-02 | End: 2023-08-02 | Stop reason: SDUPTHER

## 2023-08-02 RX ORDER — LABETALOL HYDROCHLORIDE 5 MG/ML
40 INJECTION, SOLUTION INTRAVENOUS
Status: COMPLETED | OUTPATIENT
Start: 2023-08-02 | End: 2023-08-02

## 2023-08-02 RX ORDER — SODIUM CHLORIDE 9 MG/ML
INJECTION, SOLUTION INTRAVENOUS PRN
Status: DISCONTINUED | OUTPATIENT
Start: 2023-08-02 | End: 2023-08-02

## 2023-08-02 RX ORDER — KETOROLAC TROMETHAMINE 30 MG/ML
30 INJECTION, SOLUTION INTRAMUSCULAR; INTRAVENOUS EVERY 6 HOURS
Status: DISPENSED | OUTPATIENT
Start: 2023-08-02 | End: 2023-08-03

## 2023-08-02 RX ORDER — SODIUM CHLORIDE 0.9 % (FLUSH) 0.9 %
10 SYRINGE (ML) INJECTION PRN
Status: DISCONTINUED | OUTPATIENT
Start: 2023-08-02 | End: 2023-08-02

## 2023-08-02 RX ORDER — SODIUM CHLORIDE 0.9 % (FLUSH) 0.9 %
10 SYRINGE (ML) INJECTION EVERY 12 HOURS SCHEDULED
Status: DISCONTINUED | OUTPATIENT
Start: 2023-08-02 | End: 2023-08-02

## 2023-08-02 RX ORDER — OXYCODONE HYDROCHLORIDE 5 MG/1
5 TABLET ORAL EVERY 4 HOURS PRN
Status: DISCONTINUED | OUTPATIENT
Start: 2023-08-02 | End: 2023-08-05 | Stop reason: HOSPADM

## 2023-08-02 RX ORDER — OXYTOCIN/0.9 % SODIUM CHLORIDE 30/500 ML
909 PLASTIC BAG, INJECTION (ML) INTRAVENOUS PRN
Status: COMPLETED | OUTPATIENT
Start: 2023-08-02 | End: 2023-08-02

## 2023-08-02 RX ORDER — SODIUM CHLORIDE, SODIUM LACTATE, POTASSIUM CHLORIDE, AND CALCIUM CHLORIDE .6; .31; .03; .02 G/100ML; G/100ML; G/100ML; G/100ML
1000 INJECTION, SOLUTION INTRAVENOUS ONCE
Status: DISCONTINUED | OUTPATIENT
Start: 2023-08-02 | End: 2023-08-02

## 2023-08-02 RX ORDER — FERROUS SULFATE 325(65) MG
325 TABLET ORAL 2 TIMES DAILY WITH MEALS
Status: DISCONTINUED | OUTPATIENT
Start: 2023-08-03 | End: 2023-08-05 | Stop reason: HOSPADM

## 2023-08-02 RX ORDER — LABETALOL HYDROCHLORIDE 5 MG/ML
10 INJECTION, SOLUTION INTRAVENOUS
Status: DISCONTINUED | OUTPATIENT
Start: 2023-08-02 | End: 2023-08-05 | Stop reason: HOSPADM

## 2023-08-02 RX ORDER — PROPOFOL 10 MG/ML
INJECTION, EMULSION INTRAVENOUS PRN
Status: DISCONTINUED | OUTPATIENT
Start: 2023-08-02 | End: 2023-08-02 | Stop reason: SDUPTHER

## 2023-08-02 RX ORDER — IPRATROPIUM BROMIDE AND ALBUTEROL SULFATE 2.5; .5 MG/3ML; MG/3ML
1 SOLUTION RESPIRATORY (INHALATION)
Status: ACTIVE | OUTPATIENT
Start: 2023-08-02 | End: 2023-08-03

## 2023-08-02 RX ORDER — HYDRALAZINE HYDROCHLORIDE 20 MG/ML
10 INJECTION INTRAMUSCULAR; INTRAVENOUS
Status: ACTIVE | OUTPATIENT
Start: 2023-08-02 | End: 2023-08-03

## 2023-08-02 RX ORDER — MODIFIED LANOLIN
OINTMENT (GRAM) TOPICAL
Status: DISCONTINUED | OUTPATIENT
Start: 2023-08-02 | End: 2023-08-05 | Stop reason: HOSPADM

## 2023-08-02 RX ORDER — DEXAMETHASONE SODIUM PHOSPHATE 10 MG/ML
INJECTION, SOLUTION INTRAMUSCULAR; INTRAVENOUS PRN
Status: DISCONTINUED | OUTPATIENT
Start: 2023-08-02 | End: 2023-08-02 | Stop reason: SDUPTHER

## 2023-08-02 RX ORDER — ENOXAPARIN SODIUM 100 MG/ML
40 INJECTION SUBCUTANEOUS DAILY
Status: DISCONTINUED | OUTPATIENT
Start: 2023-08-03 | End: 2023-08-05 | Stop reason: HOSPADM

## 2023-08-02 RX ORDER — MORPHINE SULFATE 2 MG/ML
2 INJECTION, SOLUTION INTRAMUSCULAR; INTRAVENOUS
Status: DISCONTINUED | OUTPATIENT
Start: 2023-08-02 | End: 2023-08-05 | Stop reason: HOSPADM

## 2023-08-02 RX ORDER — PROCHLORPERAZINE EDISYLATE 5 MG/ML
5 INJECTION INTRAMUSCULAR; INTRAVENOUS
Status: ACTIVE | OUTPATIENT
Start: 2023-08-02 | End: 2023-08-03

## 2023-08-02 RX ORDER — ONDANSETRON 2 MG/ML
INJECTION INTRAMUSCULAR; INTRAVENOUS PRN
Status: DISCONTINUED | OUTPATIENT
Start: 2023-08-02 | End: 2023-08-02 | Stop reason: SDUPTHER

## 2023-08-02 RX ORDER — MEPERIDINE HYDROCHLORIDE 25 MG/ML
12.5 INJECTION INTRAMUSCULAR; INTRAVENOUS; SUBCUTANEOUS EVERY 5 MIN PRN
Status: DISCONTINUED | OUTPATIENT
Start: 2023-08-02 | End: 2023-08-05 | Stop reason: HOSPADM

## 2023-08-02 RX ORDER — MISOPROSTOL 200 UG/1
800 TABLET ORAL PRN
Status: DISCONTINUED | OUTPATIENT
Start: 2023-08-02 | End: 2023-08-05 | Stop reason: HOSPADM

## 2023-08-02 RX ORDER — IBUPROFEN 800 MG/1
800 TABLET ORAL EVERY 8 HOURS PRN
Status: DISCONTINUED | OUTPATIENT
Start: 2023-08-02 | End: 2023-08-05 | Stop reason: HOSPADM

## 2023-08-02 RX ORDER — ONDANSETRON 4 MG/1
8 TABLET, ORALLY DISINTEGRATING ORAL EVERY 8 HOURS PRN
Status: DISCONTINUED | OUTPATIENT
Start: 2023-08-02 | End: 2023-08-05 | Stop reason: HOSPADM

## 2023-08-02 RX ORDER — MIDAZOLAM HYDROCHLORIDE 1 MG/ML
2 INJECTION INTRAMUSCULAR; INTRAVENOUS
Status: ACTIVE | OUTPATIENT
Start: 2023-08-02 | End: 2023-08-03

## 2023-08-02 RX ORDER — DIPHENHYDRAMINE HYDROCHLORIDE 50 MG/ML
12.5 INJECTION INTRAMUSCULAR; INTRAVENOUS
Status: ACTIVE | OUTPATIENT
Start: 2023-08-02 | End: 2023-08-03

## 2023-08-02 RX ORDER — LABETALOL HYDROCHLORIDE 5 MG/ML
80 INJECTION, SOLUTION INTRAVENOUS
Status: ACTIVE | OUTPATIENT
Start: 2023-08-02 | End: 2023-08-03

## 2023-08-02 RX ORDER — NEOSTIGMINE METHYLSULFATE 1 MG/ML
INJECTION, SOLUTION INTRAVENOUS PRN
Status: DISCONTINUED | OUTPATIENT
Start: 2023-08-02 | End: 2023-08-02 | Stop reason: SDUPTHER

## 2023-08-02 RX ORDER — FENTANYL CITRATE 50 UG/ML
INJECTION, SOLUTION INTRAMUSCULAR; INTRAVENOUS PRN
Status: DISCONTINUED | OUTPATIENT
Start: 2023-08-02 | End: 2023-08-02 | Stop reason: SDUPTHER

## 2023-08-02 RX ORDER — DOCUSATE SODIUM 100 MG/1
100 CAPSULE, LIQUID FILLED ORAL 2 TIMES DAILY
Status: DISCONTINUED | OUTPATIENT
Start: 2023-08-02 | End: 2023-08-05 | Stop reason: HOSPADM

## 2023-08-02 RX ORDER — OXYCODONE HYDROCHLORIDE 5 MG/1
10 TABLET ORAL EVERY 4 HOURS PRN
Status: DISCONTINUED | OUTPATIENT
Start: 2023-08-02 | End: 2023-08-05 | Stop reason: HOSPADM

## 2023-08-02 RX ORDER — KETOROLAC TROMETHAMINE 30 MG/ML
INJECTION, SOLUTION INTRAMUSCULAR; INTRAVENOUS PRN
Status: DISCONTINUED | OUTPATIENT
Start: 2023-08-02 | End: 2023-08-02 | Stop reason: SDUPTHER

## 2023-08-02 RX ORDER — ACETAMINOPHEN 500 MG
1000 TABLET ORAL EVERY 8 HOURS PRN
Status: DISCONTINUED | OUTPATIENT
Start: 2023-08-02 | End: 2023-08-05 | Stop reason: HOSPADM

## 2023-08-02 RX ORDER — HYDRALAZINE HYDROCHLORIDE 20 MG/ML
10 INJECTION INTRAMUSCULAR; INTRAVENOUS
Status: DISCONTINUED | OUTPATIENT
Start: 2023-08-02 | End: 2023-08-05 | Stop reason: HOSPADM

## 2023-08-02 RX ORDER — LABETALOL HYDROCHLORIDE 5 MG/ML
INJECTION, SOLUTION INTRAVENOUS
Status: COMPLETED
Start: 2023-08-02 | End: 2023-08-02

## 2023-08-02 RX ORDER — ONDANSETRON 2 MG/ML
4 INJECTION INTRAMUSCULAR; INTRAVENOUS
Status: ACTIVE | OUTPATIENT
Start: 2023-08-02 | End: 2023-08-03

## 2023-08-02 RX ORDER — MORPHINE SULFATE 2 MG/ML
2 INJECTION, SOLUTION INTRAMUSCULAR; INTRAVENOUS EVERY 5 MIN PRN
Status: DISCONTINUED | OUTPATIENT
Start: 2023-08-02 | End: 2023-08-05 | Stop reason: HOSPADM

## 2023-08-02 RX ORDER — METHYLERGONOVINE MALEATE 0.2 MG/ML
200 INJECTION INTRAVENOUS PRN
Status: DISCONTINUED | OUTPATIENT
Start: 2023-08-02 | End: 2023-08-05 | Stop reason: HOSPADM

## 2023-08-02 RX ORDER — GLYCOPYRROLATE 0.2 MG/ML
INJECTION INTRAMUSCULAR; INTRAVENOUS PRN
Status: DISCONTINUED | OUTPATIENT
Start: 2023-08-02 | End: 2023-08-02 | Stop reason: SDUPTHER

## 2023-08-02 RX ORDER — SUCCINYLCHOLINE/SOD CL,ISO/PF 200MG/10ML
SYRINGE (ML) INTRAVENOUS PRN
Status: DISCONTINUED | OUTPATIENT
Start: 2023-08-02 | End: 2023-08-02 | Stop reason: SDUPTHER

## 2023-08-02 RX ADMIN — Medication 3 MG: at 16:49

## 2023-08-02 RX ADMIN — Medication 120 MG: at 16:29

## 2023-08-02 RX ADMIN — Medication 909 MILLI-UNITS/MIN: at 16:35

## 2023-08-02 RX ADMIN — LABETALOL HYDROCHLORIDE 20 MG: 5 INJECTION, SOLUTION INTRAVENOUS at 18:09

## 2023-08-02 RX ADMIN — DEXAMETHASONE SODIUM PHOSPHATE 10 MG: 10 INJECTION, SOLUTION INTRAMUSCULAR; INTRAVENOUS at 16:37

## 2023-08-02 RX ADMIN — DOCUSATE SODIUM 100 MG: 100 CAPSULE, LIQUID FILLED ORAL at 22:05

## 2023-08-02 RX ADMIN — FENTANYL CITRATE 100 MCG: 50 INJECTION, SOLUTION INTRAMUSCULAR; INTRAVENOUS at 16:37

## 2023-08-02 RX ADMIN — ROCURONIUM BROMIDE 40 MG: 10 INJECTION, SOLUTION INTRAVENOUS at 16:31

## 2023-08-02 RX ADMIN — FENTANYL CITRATE 100 MCG: 50 INJECTION, SOLUTION INTRAMUSCULAR; INTRAVENOUS at 17:08

## 2023-08-02 RX ADMIN — KETOROLAC TROMETHAMINE 30 MG: 30 INJECTION, SOLUTION INTRAMUSCULAR; INTRAVENOUS at 17:08

## 2023-08-02 RX ADMIN — MORPHINE SULFATE 2 MG: 2 INJECTION, SOLUTION INTRAMUSCULAR; INTRAVENOUS at 18:24

## 2023-08-02 RX ADMIN — PROPOFOL 200 MG: 10 INJECTION, EMULSION INTRAVENOUS at 16:29

## 2023-08-02 RX ADMIN — KETOROLAC TROMETHAMINE 30 MG: 30 INJECTION, SOLUTION INTRAMUSCULAR; INTRAVENOUS at 23:33

## 2023-08-02 RX ADMIN — SODIUM CHLORIDE, POTASSIUM CHLORIDE, SODIUM LACTATE AND CALCIUM CHLORIDE: 600; 310; 30; 20 INJECTION, SOLUTION INTRAVENOUS at 16:14

## 2023-08-02 RX ADMIN — WATER 2000 MG: 1 INJECTION INTRAMUSCULAR; INTRAVENOUS; SUBCUTANEOUS at 16:03

## 2023-08-02 RX ADMIN — ROCURONIUM BROMIDE 10 MG: 10 INJECTION, SOLUTION INTRAVENOUS at 16:29

## 2023-08-02 RX ADMIN — ONDANSETRON 4 MG: 2 INJECTION INTRAMUSCULAR; INTRAVENOUS at 16:37

## 2023-08-02 RX ADMIN — LABETALOL HYDROCHLORIDE 20 MG: 5 INJECTION INTRAVENOUS at 18:09

## 2023-08-02 RX ADMIN — GLYCOPYRROLATE 0.6 MG: 0.2 INJECTION INTRAMUSCULAR; INTRAVENOUS at 16:49

## 2023-08-02 RX ADMIN — LABETALOL HYDROCHLORIDE 40 MG: 5 INJECTION INTRAVENOUS at 18:20

## 2023-08-02 RX ADMIN — SODIUM CITRATE AND CITRIC ACID MONOHYDRATE 30 ML: 500; 334 SOLUTION ORAL at 15:35

## 2023-08-02 RX ADMIN — Medication 999 ML/HR: at 16:36

## 2023-08-02 ASSESSMENT — PAIN DESCRIPTION - LOCATION
LOCATION: ABDOMEN
LOCATION: ABDOMEN;INCISION

## 2023-08-02 ASSESSMENT — PAIN DESCRIPTION - ORIENTATION: ORIENTATION: LOWER;MID

## 2023-08-02 ASSESSMENT — PAIN SCALES - GENERAL
PAINLEVEL_OUTOF10: 4
PAINLEVEL_OUTOF10: 10

## 2023-08-02 ASSESSMENT — PAIN DESCRIPTION - DESCRIPTORS: DESCRIPTORS: BURNING;DISCOMFORT

## 2023-08-02 ASSESSMENT — PAIN - FUNCTIONAL ASSESSMENT: PAIN_FUNCTIONAL_ASSESSMENT: ACTIVITIES ARE NOT PREVENTED

## 2023-08-02 NOTE — PROGRESS NOTES
Assumed care of patient. Patient resting in bed. Admission completed. Plan of care. Discussed plan of care discussed with patient.  CRNA to bedside

## 2023-08-02 NOTE — OP NOTE
PATIENT:    Jamilah Palomo    PREOPERATIVE DIAGNOSES:  1. @ 39w1d        2. Previous  x1 , breech presentation     POSTOPERATIVE DIAGNOSES:  Same plus pelvic adhesions    PROCEDURE:     Repeat low transverse  section. SURGEON:     Neva Larsen MD      ESTIMATED BLOOD LOSS:   700 mL. COMPLICATIONS:     None. ANESTHESIA:    General         FINDINGS:   Live male              Weight: 8 lbs 5 oz. Infant Apgars 9 and 9 at 1 and 5 min respectively      Normal tubes and ovaries bilaterally. DETAILS OF PROCEDURE:   With the patient in the sitting position, spinal anesthesia was attempted however the patient could not relax enough to finish the procedure after numbing the skin. Consequently general anesthesia was advised. Abdomen was scrubbed and draped in the usual manner. General anesthesia was administered through endotracheal intubation. . The abdomen was entered thru a transverse incision The Bovie was used to go through the subcutaneous tissue. The fascia was entered in the same plane as the skin incision and  from the underlying rectus abdominis muscles which were  in the midline exposing the parietal peritoneum. The peritoneum was opened sharply in a longitudinal fashion. An Galo OB retractor was placed in position. The lower uterine segment was opened in a low transverse fashion. The amniotic cavity was entered and Clear amniotic fluid was suctioned out. The baby was then delivered from the Breech position, by complete breech extraction without any complications. The baby was handed over to the nursery nurse. Cord blood was saved. The placenta was then removed manually and the endometrial cavity was swept clean by a wet lap. The edges of the uterine incision were grasped by Allis clamps and the incision itself was closed using a continuous locked suture of 0 monocryl.  Tubes and ovaries were inspected and appeared to be normal. The

## 2023-08-02 NOTE — ANESTHESIA POSTPROCEDURE EVALUATION
Department of Anesthesiology  Postprocedure Note    Patient: Roxy Tucker  MRN: [id number]  YOB: 1990  Date of evaluation: 2023      Procedure Summary     Date: 23 Room / Location: [facility] L&D [place]  [address] [address]    Anesthesia Start: 2429 Anesthesia Stop: 9734    Procedure: REPEAT  SECTION (Abdomen) Diagnosis:       Status post repeat low transverse  section      (Status post repeat low transverse  section [L89.255])    Surgeons: Bertram Vitale MD Responsible Provider: Bryn Ndiaye MD    Anesthesia Type: General ASA Status: 2          Anesthesia Type: General    Mario Phase I:      Mario Phase II:        Anesthesia Post Evaluation    Patient location during evaluation: PACU  Patient participation: complete - patient participated  Level of consciousness: awake  Airway patency: patent  Nausea & Vomiting: no nausea and no vomiting  Complications: no  Cardiovascular status: hemodynamically stable  Respiratory status: acceptable  Hydration status: euvolemic  Pain management: adequate

## 2023-08-02 NOTE — PROGRESS NOTES
39w1d KAYCEE 23 ambulatory to the L&D unit for scheduled  section. EFM applied. Maternal perception of fetal movement noted. Pt denies any bleeding or leaking fluids. Pt denies any contractions or pain. Call light within reach.

## 2023-08-02 NOTE — H&P
Subjective:      Barb Noel is an 33 y.o. female at 44 and 1/7 weeks gestation presenting with   Chief Complaint   Patient presents with    Scheduled    . She is also complaining of contractions every a few minutes lasting few seconds. Other associated symptoms include low back pain. Fetal Movement: normal.  Patient's medications, allergies, past medical, surgical, social and family histories were reviewed and updated as appropriate. Social History:  TOBACCO:   reports that she has never smoked. She has never used smokeless tobacco.  ETOH:   reports no history of alcohol use. DRUGS:   reports no history of drug use.     Family History:       Problem Relation Age of Onset    High Blood Pressure Mother     Graves Disease Mother     Cancer Father         lung and brain cancer age of death 64     Other Brother         alcoholic    Diabetes Maternal Aunt        meds:  Current Facility-Administered Medications:     lactated ringers IV soln infusion, , IntraVENous, Continuous, Neva Larsen MD    lactated ringers bolus bolus 1,000 mL, 1,000 mL, IntraVENous, Once, Neva Larsen MD    sodium chloride flush 0.9 % injection 10 mL, 10 mL, IntraVENous, 2 times per day, Neva Larsen MD    sodium chloride flush 0.9 % injection 10 mL, 10 mL, IntraVENous, PRN, Neva Larsen MD    0.9 % sodium chloride infusion, , IntraVENous, PRN, Neva Lasren MD    oxytocin (PITOCIN) 30 units in 500 mL infusion, 87.3 aguilar-units/min, IntraVENous, Continuous PRN **AND** oxytocin (PITOCIN) 30 units in 500 mL infusion, 909 aguilar-units/min, IntraVENous, PRN, Neva Larsen MD       Allergies:  Bactrim [sulfamethoxazole-trimethoprim]    Review of Systems  Review of Systems -  General ROS: negative for - chills, fatigue or malaise  ENT ROS: negative for - hearing change, nasal congestion or nasal discharge  Allergy and Immunology ROS: negative for - hives, itchy/watery eyes or nasal congestion  Hematological and

## 2023-08-03 LAB
HCT VFR BLD AUTO: 26.9 % (ref 34–48)
HGB BLD-MCNC: 9 G/DL (ref 11.5–15.5)
RPR SER QL: NONREACTIVE

## 2023-08-03 PROCEDURE — 1220000001 HC SEMI PRIVATE L&D R&B

## 2023-08-03 PROCEDURE — 6360000002 HC RX W HCPCS: Performed by: OBSTETRICS & GYNECOLOGY

## 2023-08-03 PROCEDURE — 85018 HEMOGLOBIN: CPT

## 2023-08-03 PROCEDURE — 85014 HEMATOCRIT: CPT

## 2023-08-03 PROCEDURE — 6370000000 HC RX 637 (ALT 250 FOR IP): Performed by: OBSTETRICS & GYNECOLOGY

## 2023-08-03 RX ADMIN — OXYCODONE HYDROCHLORIDE 5 MG: 5 TABLET ORAL at 18:57

## 2023-08-03 RX ADMIN — IBUPROFEN 800 MG: 800 TABLET, FILM COATED ORAL at 14:46

## 2023-08-03 RX ADMIN — ENOXAPARIN SODIUM 40 MG: 100 INJECTION SUBCUTANEOUS at 07:44

## 2023-08-03 RX ADMIN — KETOROLAC TROMETHAMINE 30 MG: 30 INJECTION, SOLUTION INTRAMUSCULAR; INTRAVENOUS at 05:33

## 2023-08-03 RX ADMIN — FERROUS SULFATE TAB 325 MG (65 MG ELEMENTAL FE) 325 MG: 325 (65 FE) TAB at 07:44

## 2023-08-03 RX ADMIN — DOCUSATE SODIUM 100 MG: 100 CAPSULE, LIQUID FILLED ORAL at 07:44

## 2023-08-03 RX ADMIN — ACETAMINOPHEN 1000 MG: 500 TABLET ORAL at 12:02

## 2023-08-03 RX ADMIN — FERROUS SULFATE TAB 325 MG (65 MG ELEMENTAL FE) 325 MG: 325 (65 FE) TAB at 17:28

## 2023-08-03 RX ADMIN — DOCUSATE SODIUM 100 MG: 100 CAPSULE, LIQUID FILLED ORAL at 21:15

## 2023-08-03 ASSESSMENT — PAIN - FUNCTIONAL ASSESSMENT
PAIN_FUNCTIONAL_ASSESSMENT: ACTIVITIES ARE NOT PREVENTED

## 2023-08-03 ASSESSMENT — PAIN SCALES - GENERAL
PAINLEVEL_OUTOF10: 4
PAINLEVEL_OUTOF10: 3
PAINLEVEL_OUTOF10: 4
PAINLEVEL_OUTOF10: 3

## 2023-08-03 ASSESSMENT — PAIN DESCRIPTION - DESCRIPTORS
DESCRIPTORS: BURNING;DISCOMFORT;CRAMPING
DESCRIPTORS: ACHING
DESCRIPTORS: CRAMPING
DESCRIPTORS: BURNING;DISCOMFORT

## 2023-08-03 ASSESSMENT — PAIN DESCRIPTION - LOCATION
LOCATION: INCISION
LOCATION: ABDOMEN
LOCATION: HEAD
LOCATION: ABDOMEN

## 2023-08-03 ASSESSMENT — PAIN DESCRIPTION - ORIENTATION
ORIENTATION: LOWER
ORIENTATION: LOWER
ORIENTATION: LOWER;MID

## 2023-08-03 NOTE — PROGRESS NOTES
Assumed care of patient for 0700 shift. Plan of care discussed at bedside and assessment completed, pt declines pain medication. Pt up to restroom with 2 stand by assist, pt tolerated well and voided.

## 2023-08-03 NOTE — CARE COORDINATION
8/3/2023: SS Note:  SS Consult noted regarding \"no custody of 11year old daughter\", sw attempted to meet with pt,introduced self and role, pt was currently holding her  baby, her daughter was present along with 2 other visitors, she requested sw return later to discuss consult when her visitors and daughter are not present, nursing informed, sw to follow. Electronically signed by EVAN Buckner on 8/3/2023 at 2:07 PM

## 2023-08-04 PROCEDURE — 1220000001 HC SEMI PRIVATE L&D R&B

## 2023-08-04 PROCEDURE — 6360000002 HC RX W HCPCS: Performed by: OBSTETRICS & GYNECOLOGY

## 2023-08-04 PROCEDURE — 6370000000 HC RX 637 (ALT 250 FOR IP): Performed by: OBSTETRICS & GYNECOLOGY

## 2023-08-04 RX ADMIN — DOCUSATE SODIUM 100 MG: 100 CAPSULE, LIQUID FILLED ORAL at 21:42

## 2023-08-04 RX ADMIN — IBUPROFEN 800 MG: 800 TABLET, FILM COATED ORAL at 14:28

## 2023-08-04 RX ADMIN — ENOXAPARIN SODIUM 40 MG: 100 INJECTION SUBCUTANEOUS at 07:56

## 2023-08-04 RX ADMIN — OXYCODONE HYDROCHLORIDE 10 MG: 5 TABLET ORAL at 22:06

## 2023-08-04 RX ADMIN — OXYCODONE HYDROCHLORIDE 5 MG: 5 TABLET ORAL at 06:17

## 2023-08-04 RX ADMIN — FERROUS SULFATE TAB 325 MG (65 MG ELEMENTAL FE) 325 MG: 325 (65 FE) TAB at 07:55

## 2023-08-04 RX ADMIN — FERROUS SULFATE TAB 325 MG (65 MG ELEMENTAL FE) 325 MG: 325 (65 FE) TAB at 17:00

## 2023-08-04 RX ADMIN — OXYCODONE HYDROCHLORIDE 5 MG: 5 TABLET ORAL at 12:43

## 2023-08-04 RX ADMIN — DOCUSATE SODIUM 100 MG: 100 CAPSULE, LIQUID FILLED ORAL at 07:55

## 2023-08-04 ASSESSMENT — PAIN SCALES - GENERAL
PAINLEVEL_OUTOF10: 5
PAINLEVEL_OUTOF10: 7
PAINLEVEL_OUTOF10: 5
PAINLEVEL_OUTOF10: 4

## 2023-08-04 ASSESSMENT — PAIN - FUNCTIONAL ASSESSMENT
PAIN_FUNCTIONAL_ASSESSMENT: ACTIVITIES ARE NOT PREVENTED

## 2023-08-04 ASSESSMENT — PAIN DESCRIPTION - ORIENTATION
ORIENTATION: LOWER
ORIENTATION: LOWER

## 2023-08-04 ASSESSMENT — PAIN DESCRIPTION - LOCATION
LOCATION: ABDOMEN;INCISION
LOCATION: ABDOMEN
LOCATION: ABDOMEN
LOCATION: ABDOMEN;BACK

## 2023-08-04 ASSESSMENT — PAIN DESCRIPTION - DESCRIPTORS
DESCRIPTORS: ACHING;CRAMPING
DESCRIPTORS: DISCOMFORT;HEAVINESS
DESCRIPTORS: CRAMPING;DISCOMFORT

## 2023-08-04 NOTE — PROGRESS NOTES
Assumed care of pt. Bedside shift report received. Plan of care discussed at bedside and call light within reach.

## 2023-08-04 NOTE — CARE COORDINATION
2023: SS Note:  Met with pt, Eveline Mcmillan, who was currently holding her  son, Nitin Contreras and appeared to be very loving and affectionate toward  during sw visit, explained sw role and consult. Latoya Fair was pleasant and open to speaking with sw, she reports making some \"bad decisions\" in the past when she lost custody of her now 11year old daughter, Alejandrina Abel to her biological father, she did not want to elaborate but  she states \"I have turned my life around\", she denies any current illicit drug use, UDS was negative, no CSB alert letter noted. She reports living on her own but that father of baby, Nitin Contreras is involved and supportive and she also still has a relationship with her daughter's biological father, says they \"co parent \" Kody and she is hoping to go back to court in the future to ask for \"shared custody\". She reports having all the necessary provisions to take her baby home and safely care for him (car seat, basinet, pack-n-play, clothing, diapers etc) and she is already active with [facility]. Sw provided and reviewed maternity resource packet and PPD counseling resources but pt denies any current PPD feelings or need for counseling services but was agreeable to [facility] referral, Lauryn Gann at agency notified and will reach out to Latoya Fair to arrange home visit after discharge. Nursing informed of currently there are no SS concerns noted for 's release home with his mother when baby is medically discharged. Electronically signed by EVAN Weinberg on 2023 at 11:25 AM

## 2023-08-04 NOTE — PLAN OF CARE
Problem: Pain  Goal: Verbalizes/displays adequate comfort level or baseline comfort level  8/4/2023 0244 by Anton Ross RN  Outcome: Progressing

## 2023-08-04 NOTE — PROGRESS NOTES
Assumed care of patient for 7 pm - 7 am shift. Plan of care discussed and agreed upon. ABD binder on patient. All needs addressed. Call light with in reach.

## 2023-08-04 NOTE — PROGRESS NOTES
[facility] Hearing screening results were discussed with parent. Questions answered. Brochure given to parent. Advised to monitor developmental milestones and contact physician for any concerns.    Aide Villeda

## 2023-08-04 NOTE — PROGRESS NOTES
RN to bedside, pt reports increased pain  Medication provided and ice applied to incision.    RN assessed incision, new bruising below incision identified

## 2023-08-04 NOTE — PROGRESS NOTES
Assumed care of patient at this time. Report received from previous RN at bedside. Plan of care discussed, White board updated, Fresh water offered and provided. call light within reach, no additional concerns at this time. Safe sleep for baby reviewed and verbalized understanding.

## 2023-08-05 VITALS
RESPIRATION RATE: 17 BRPM | OXYGEN SATURATION: 99 % | HEART RATE: 83 BPM | TEMPERATURE: 97.6 F | DIASTOLIC BLOOD PRESSURE: 88 MMHG | BODY MASS INDEX: 27.75 KG/M2 | HEIGHT: 61 IN | SYSTOLIC BLOOD PRESSURE: 136 MMHG | WEIGHT: 147 LBS

## 2023-08-05 PROCEDURE — 90715 TDAP VACCINE 7 YRS/> IM: CPT | Performed by: OBSTETRICS & GYNECOLOGY

## 2023-08-05 PROCEDURE — 6360000002 HC RX W HCPCS: Performed by: OBSTETRICS & GYNECOLOGY

## 2023-08-05 PROCEDURE — 90471 IMMUNIZATION ADMIN: CPT | Performed by: OBSTETRICS & GYNECOLOGY

## 2023-08-05 PROCEDURE — 6370000000 HC RX 637 (ALT 250 FOR IP): Performed by: OBSTETRICS & GYNECOLOGY

## 2023-08-05 RX ORDER — IBUPROFEN 800 MG/1
800 TABLET ORAL EVERY 8 HOURS PRN
Qty: 120 TABLET | Refills: 0 | Status: SHIPPED | OUTPATIENT
Start: 2023-08-05

## 2023-08-05 RX ADMIN — TETANUS TOXOID, REDUCED DIPHTHERIA TOXOID AND ACELLULAR PERTUSSIS VACCINE, ADSORBED 0.5 ML: 5; 2.5; 8; 8; 2.5 SUSPENSION INTRAMUSCULAR at 10:39

## 2023-08-05 RX ADMIN — FERROUS SULFATE TAB 325 MG (65 MG ELEMENTAL FE) 325 MG: 325 (65 FE) TAB at 08:39

## 2023-08-05 RX ADMIN — DOCUSATE SODIUM 100 MG: 100 CAPSULE, LIQUID FILLED ORAL at 08:39

## 2023-08-05 RX ADMIN — IBUPROFEN 800 MG: 800 TABLET, FILM COATED ORAL at 06:29

## 2023-08-05 NOTE — DISCHARGE INSTRUCTIONS
Section: What to Expect at [address] [place] [address]     A  section, or , is surgery to deliver your baby through a cut, called an incision, that the doctor makes in your lower belly and uterus. You may have some pain in your lower belly and need pain medicine for 1 to 2 weeks. You can expect some vaginal bleeding for several weeks. You will probably need about 6 weeks to fully recover. It is important to take it easy while the incision is healing. Avoid heavy lifting, strenuous activities, or exercises that strain the belly muscles while you are recovering. Ask a family member or friend for help with housework, cooking, and shopping. This care sheet gives you a general idea about how long it will take for you to recover. But each person recovers at a different pace. Follow the steps below to get better as quickly as possible. How can you care for yourself at home? Activity  Rest when you feel tired. Getting enough sleep will help you recover. Try to walk each day. Start by walking a little more than you did the day before. Bit by bit, increase the amount you walk. Walking boosts blood flow and helps prevent pneumonia, constipation, and blood clots. Avoid strenuous activities, such as bicycle riding, jogging, weightlifting, and aerobic exercise, for 6 weeks or until your doctor says it is okay. Until your doctor says it is okay, do not lift anything heavier than your baby. Do not do sit-ups or other exercises that strain the belly muscles for 6 weeks or until your doctor says it is okay. Hold a pillow over your incision when you cough or take deep breaths. This will support your belly and decrease your pain. You may shower as usual. Pat the incision dry when you are done. You will have some vaginal bleeding. Wear sanitary pads. Do not douche or use tampons until your doctor says it is okay. Ask your doctor when you can drive again.   You will probably need to take at least 6 weeks

## 2023-08-05 NOTE — PROGRESS NOTES
RN to bedside to assist with changing diaper following circumcision. Reeducated mom on care.  Verbalized understanding

## 2023-08-05 NOTE — PROGRESS NOTES
RN to bedside to assist with breastfeeding.  Baby latched utilizing nipple shield on right breast.  Will continue to monitor

## 2023-08-05 NOTE — PROGRESS NOTES
Patient called out stating  done feeding and would like him to go to the nursery. Educated on pumping every 2-3 hours when not nursing to help with milk production. Verbalized understanding.

## 2023-08-05 NOTE — DISCHARGE SUMMARY
Obstetrical Discharge Form         Patients Name  Yolie Velasquez    Gestational Age:  37w4d    Antepartum complications: Previous  x1    Date of Delivery:   2023       4:35 PM      Type of Delivery:   , Low Transverse [251]   Rupture Date/time:    No data found      No data found     Presentation:    Breech [3]   Position:                      Anesthesia:    General [253]     Feeding method:         Delivered By:   Amina VICTORIA     Baby:       Information for the patient's :  Nany Brooke [[id number]]        Intrapartum complications: None  Postpartum complications: none  Discharge Date:   2023  Discharge Condition: Stable  Disposition:   Home    Plan:   Follow up    in 2 weeks

## 2023-08-05 NOTE — PLAN OF CARE
Problem: Pain  Goal: Verbalizes/displays adequate comfort level or baseline comfort level  Outcome: Progressing     Problem: Postpartum  Goal: Experiences normal postpartum course  Description:  Postpartum OB-Pregnancy care plan goal which identifies if the mother is experiencing a normal postpartum course  Outcome: Progressing  Goal: Appropriate maternal -  bonding  Description:  Postpartum OB-Pregnancy care plan goal which identifies if the mother and  are bonding appropriately  Outcome: Progressing  Goal: Establishment of infant feeding pattern  Description:  Postpartum OB-Pregnancy care plan goal which identifies if the mother is establishing a feeding pattern with their   Outcome: Progressing  Goal: Incisions, wounds, or drain sites healing without S/S of infection  Outcome: Progressing     Problem: Infection - Adult  Goal: Absence of infection at discharge  Outcome: Progressing     Problem: Safety - Adult  Goal: Free from fall injury  Outcome: Progressing

## 2023-12-24 ENCOUNTER — APPOINTMENT (OUTPATIENT)
Dept: GENERAL RADIOLOGY | Age: 33
End: 2023-12-24
Payer: COMMERCIAL

## 2023-12-24 ENCOUNTER — HOSPITAL ENCOUNTER (EMERGENCY)
Age: 33
Discharge: HOME | End: 2023-12-24
Payer: COMMERCIAL

## 2023-12-24 VITALS
DIASTOLIC BLOOD PRESSURE: 89 MMHG | HEART RATE: 99 BPM | SYSTOLIC BLOOD PRESSURE: 153 MMHG | OXYGEN SATURATION: 100 % | RESPIRATION RATE: 20 BRPM | TEMPERATURE: 98.3 F

## 2023-12-24 DIAGNOSIS — B33.8 RESPIRATORY SYNCYTIAL VIRUS (RSV): Primary | ICD-10-CM

## 2023-12-24 DIAGNOSIS — B34.9 ACUTE VIRAL SYNDROME: ICD-10-CM

## 2023-12-24 DIAGNOSIS — R03.0 ELEVATED BLOOD PRESSURE READING: ICD-10-CM

## 2023-12-24 LAB
BACTERIA URNS QL MICRO: ABNORMAL
BILIRUB UR QL STRIP: NEGATIVE
CLARITY UR: CLEAR
COLOR UR: YELLOW
EPI CELLS #/AREA URNS HPF: ABNORMAL /HPF
GLUCOSE UR STRIP-MCNC: NEGATIVE MG/DL
HGB UR QL STRIP.AUTO: ABNORMAL
INFLUENZA A BY PCR: NOT DETECTED
INFLUENZA B BY PCR: NOT DETECTED
KETONES UR STRIP-MCNC: NEGATIVE MG/DL
LEUKOCYTE ESTERASE UR QL STRIP: NEGATIVE
MUCOUS THREADS URNS QL MICRO: PRESENT
NITRITE UR QL STRIP: NEGATIVE
PH UR STRIP: 5.5 [PH] (ref 5–9)
PROT UR STRIP-MCNC: 30 MG/DL
RBC #/AREA URNS HPF: ABNORMAL /HPF
RSV BY PCR: DETECTED
SARS-COV-2 RDRP RESP QL NAA+PROBE: NOT DETECTED
SP GR UR STRIP: >1.03 (ref 1–1.03)
SPECIMEN DESCRIPTION: NORMAL
SPECIMEN SOURCE: ABNORMAL
UROBILINOGEN UR STRIP-ACNC: 0.2 EU/DL (ref 0–1)
WBC #/AREA URNS HPF: ABNORMAL /HPF

## 2023-12-24 PROCEDURE — 6370000000 HC RX 637 (ALT 250 FOR IP)

## 2023-12-24 PROCEDURE — 99284 EMERGENCY DEPT VISIT MOD MDM: CPT

## 2023-12-24 PROCEDURE — 87502 INFLUENZA DNA AMP PROBE: CPT

## 2023-12-24 PROCEDURE — 71046 X-RAY EXAM CHEST 2 VIEWS: CPT

## 2023-12-24 PROCEDURE — 87634 RSV DNA/RNA AMP PROBE: CPT

## 2023-12-24 PROCEDURE — 87635 SARS-COV-2 COVID-19 AMP PRB: CPT

## 2023-12-24 PROCEDURE — 81001 URINALYSIS AUTO W/SCOPE: CPT

## 2023-12-24 RX ORDER — IBUPROFEN 600 MG/1
600 TABLET ORAL ONCE
Status: COMPLETED | OUTPATIENT
Start: 2023-12-24 | End: 2023-12-24

## 2023-12-24 RX ORDER — BROMPHENIRAMINE MALEATE, PSEUDOEPHEDRINE HYDROCHLORIDE, AND DEXTROMETHORPHAN HYDROBROMIDE 2; 30; 10 MG/5ML; MG/5ML; MG/5ML
5 SYRUP ORAL 4 TIMES DAILY PRN
Qty: 140 ML | Refills: 0 | Status: SHIPPED | OUTPATIENT
Start: 2023-12-24 | End: 2023-12-29

## 2023-12-24 RX ORDER — IBUPROFEN 600 MG/1
600 TABLET ORAL 4 TIMES DAILY PRN
Qty: 40 TABLET | Refills: 0 | Status: SHIPPED | OUTPATIENT
Start: 2023-12-24 | End: 2023-12-29

## 2023-12-24 RX ORDER — BROMPHENIRAMINE MALEATE, PSEUDOEPHEDRINE HYDROCHLORIDE, AND DEXTROMETHORPHAN HYDROBROMIDE 2; 30; 10 MG/5ML; MG/5ML; MG/5ML
5 SYRUP ORAL 4 TIMES DAILY PRN
Qty: 140 ML | Refills: 0 | Status: SHIPPED | OUTPATIENT
Start: 2023-12-24 | End: 2023-12-24

## 2023-12-24 RX ORDER — IBUPROFEN 600 MG/1
600 TABLET ORAL 4 TIMES DAILY PRN
Qty: 40 TABLET | Refills: 0 | Status: SHIPPED | OUTPATIENT
Start: 2023-12-24 | End: 2023-12-24

## 2023-12-24 RX ADMIN — IBUPROFEN 600 MG: 600 TABLET, FILM COATED ORAL at 20:53

## 2023-12-25 NOTE — ED PROVIDER NOTES
[facility]  Department of Emergency Medicine   ED  Encounter Note  Admit Date/RoomTime: 2023  8:32 PM  ED Room:     NAME: Jayson Ndiaye  : 1990  MRN: [id number]     Chief Complaint:  URI (Cough, sinus and chest congestion, headache, ear pain.)    History of Present Illness   Provided by the patient. Jayson Ndiaye is a 29 y.o. old female with a history of ADHD and depression who presents to the emergency department by private vehicle, for nasal congestion, cough, and chest congestion, which began 2 day(s) prior to arrival.  Her son recently tested positive for RSV and her daughter is currently ill with URI symptoms. Since onset the symptoms have been stable and mild in severity. The symptoms are associated with earache and headache. There has been no abdominal pain, decreased appetite, chest tightness, productive cough, vomiting, diarrhea, dizziness, dysuria, urinary frequency, fever, malaise, neck stiffness, swollen glands, or wheezing. She has not taken anything for her symptoms. ROS   Pertinent positives and negatives are stated within HPI, all other systems reviewed and are negative. Past Medical History:  has a past medical history of 39 weeks gestation of pregnancy, Abnormal glucose in pregnancy, antepartum, ADHD (attention deficit hyperactivity disorder),  delivery delivered, Cholestasis during pregnancy in third trimester, Club foot, fetal, affecting care of mother, antepartum, Depression, DM (diabetes mellitus), gestational, Maternal anemia in pregnancy, antepartum, third trimester, and Maternal care due to low transverse uterine scar from previous  delivery. Surgical History:  has a past surgical history that includes  section (N/A, 2023). Social History:  reports that she has never smoked. She has never used smokeless tobacco. She reports that she does not drink alcohol and does not use drugs.     Family

## 2023-12-29 ENCOUNTER — APPOINTMENT (OUTPATIENT)
Dept: GENERAL RADIOLOGY | Age: 33
End: 2023-12-29
Payer: COMMERCIAL

## 2023-12-29 ENCOUNTER — HOSPITAL ENCOUNTER (EMERGENCY)
Age: 33
Discharge: HOME | End: 2023-12-29
Payer: COMMERCIAL

## 2023-12-29 VITALS
OXYGEN SATURATION: 95 % | RESPIRATION RATE: 20 BRPM | BODY MASS INDEX: 21.71 KG/M2 | DIASTOLIC BLOOD PRESSURE: 80 MMHG | HEIGHT: 61 IN | HEART RATE: 110 BPM | SYSTOLIC BLOOD PRESSURE: 124 MMHG | TEMPERATURE: 98.2 F | WEIGHT: 115 LBS

## 2023-12-29 DIAGNOSIS — H60.503 ACUTE OTITIS EXTERNA OF BOTH EARS, UNSPECIFIED TYPE: Primary | ICD-10-CM

## 2023-12-29 DIAGNOSIS — J06.9 ACUTE UPPER RESPIRATORY INFECTION: ICD-10-CM

## 2023-12-29 LAB
FLUAV RNA RESP QL NAA+PROBE: NOT DETECTED
FLUBV RNA RESP QL NAA+PROBE: NOT DETECTED
SARS-COV-2 RNA RESP QL NAA+PROBE: NOT DETECTED
SOURCE: NORMAL
SPECIMEN DESCRIPTION: NORMAL

## 2023-12-29 PROCEDURE — 6370000000 HC RX 637 (ALT 250 FOR IP): Performed by: NURSE PRACTITIONER

## 2023-12-29 PROCEDURE — 99284 EMERGENCY DEPT VISIT MOD MDM: CPT

## 2023-12-29 PROCEDURE — 96372 THER/PROPH/DIAG INJ SC/IM: CPT

## 2023-12-29 PROCEDURE — 71046 X-RAY EXAM CHEST 2 VIEWS: CPT

## 2023-12-29 PROCEDURE — 6360000002 HC RX W HCPCS: Performed by: NURSE PRACTITIONER

## 2023-12-29 PROCEDURE — 87636 SARSCOV2 & INF A&B AMP PRB: CPT

## 2023-12-29 RX ORDER — OFLOXACIN 3 MG/ML
5 SOLUTION AURICULAR (OTIC) 2 TIMES DAILY
Qty: 5 ML | Refills: 0 | Status: SHIPPED | OUTPATIENT
Start: 2023-12-29 | End: 2024-01-08

## 2023-12-29 RX ORDER — CEFDINIR 300 MG/1
300 CAPSULE ORAL ONCE
Status: COMPLETED | OUTPATIENT
Start: 2023-12-29 | End: 2023-12-29

## 2023-12-29 RX ORDER — BROMPHENIRAMINE MALEATE, PSEUDOEPHEDRINE HYDROCHLORIDE, AND DEXTROMETHORPHAN HYDROBROMIDE 2; 30; 10 MG/5ML; MG/5ML; MG/5ML
5 SYRUP ORAL 4 TIMES DAILY PRN
Qty: 118 ML | Refills: 0 | Status: SHIPPED | OUTPATIENT
Start: 2023-12-29

## 2023-12-29 RX ORDER — CEFDINIR 300 MG/1
300 CAPSULE ORAL 2 TIMES DAILY
Qty: 20 CAPSULE | Refills: 0 | Status: SHIPPED | OUTPATIENT
Start: 2023-12-29 | End: 2024-01-08

## 2023-12-29 RX ORDER — KETOROLAC TROMETHAMINE 30 MG/ML
30 INJECTION, SOLUTION INTRAMUSCULAR; INTRAVENOUS ONCE
Status: COMPLETED | OUTPATIENT
Start: 2023-12-29 | End: 2023-12-29

## 2023-12-29 RX ORDER — CIPROFLOXACIN HYDROCHLORIDE 3.5 MG/ML
5 SOLUTION/ DROPS TOPICAL DAILY
Status: DISCONTINUED | OUTPATIENT
Start: 2023-12-30 | End: 2023-12-30 | Stop reason: HOSPADM

## 2023-12-29 RX ORDER — IBUPROFEN 600 MG/1
600 TABLET ORAL 3 TIMES DAILY PRN
Qty: 30 TABLET | Refills: 0 | Status: SHIPPED | OUTPATIENT
Start: 2023-12-29

## 2023-12-29 RX ADMIN — CEFDINIR 300 MG: 300 CAPSULE ORAL at 21:57

## 2023-12-29 RX ADMIN — KETOROLAC TROMETHAMINE 30 MG: 30 INJECTION, SOLUTION INTRAMUSCULAR; INTRAVENOUS at 21:57

## 2023-12-29 ASSESSMENT — PAIN DESCRIPTION - DESCRIPTORS: DESCRIPTORS: ACHING;PRESSURE;SORE

## 2023-12-29 ASSESSMENT — PAIN DESCRIPTION - PAIN TYPE: TYPE: ACUTE PAIN

## 2023-12-29 ASSESSMENT — PAIN DESCRIPTION - FREQUENCY: FREQUENCY: CONTINUOUS

## 2023-12-29 ASSESSMENT — PAIN DESCRIPTION - ORIENTATION: ORIENTATION: RIGHT;LEFT

## 2023-12-29 ASSESSMENT — PAIN SCALES - GENERAL: PAINLEVEL_OUTOF10: 6

## 2023-12-29 ASSESSMENT — PAIN - FUNCTIONAL ASSESSMENT
PAIN_FUNCTIONAL_ASSESSMENT: 0-10
PAIN_FUNCTIONAL_ASSESSMENT: ACTIVITIES ARE NOT PREVENTED

## 2023-12-29 ASSESSMENT — PAIN DESCRIPTION - ONSET: ONSET: ON-GOING

## 2023-12-29 ASSESSMENT — PAIN DESCRIPTION - LOCATION: LOCATION: EAR

## 2023-12-30 NOTE — ED PROVIDER NOTES
Independent [facility] Visit.          [facility] EMERGENCY DEPARTMENT  EMERGENCY DEPARTMENT ENCOUNTER        Pt Name: Jamilah Palomo  MRN: [id number]  Birthdate 1990  Date of evaluation: 2023  Provider: TAWANDA Giraldo - CNP  PCP: No primary care provider on file.  Note Started: 10:09 PM EST 23    CHIEF COMPLAINT       Chief Complaint   Patient presents with    Ear Fullness     Bilateral ear fullness; lt ear draining yellow drainage x 3 days    Cough     Coughing nonproductively     Generalized Body Aches     Generalized body aches       HISTORY OF PRESENT ILLNESS: 1 or more Elements     History from : Patient    Limitations to history : None    Jamilah Palomo is a 33 y.o. female who presents to the ED for bilateral ear pain cough congestion body aches.  She states has been ongoing for the last 3 days diagnosed with RSV over 1 week ago states that she was feeling better she states now she has new symptoms.  Denies any nausea vomiting diarrhea chest pain shortness of breath.  Patient states that she has not tried take anything for her symptoms.  She states nothing makes her symptoms better or worse.    Nursing Notes were all reviewed and agreed with or any disagreements were addressed in the HPI.    REVIEW OF SYSTEMS :      Review of Systems   All other systems reviewed and are negative.      Positives and Pertinent negatives as per HPI.     SURGICAL HISTORY     Past Surgical History:   Procedure Laterality Date     SECTION N/A 2023    REPEAT  SECTION performed by Neva Larsen MD at [email or web address] L&D OR       CURRENTMEDICATIONS       Previous Medications    No medications on file       ALLERGIES     Bactrim [sulfamethoxazole-trimethoprim]    FAMILYHISTORY       Family History   Problem Relation Age of Onset    High Blood Pressure Mother     Graves Disease Mother     Cancer Father         lung and brain cancer age of death 56     Other Brother         alcoholic  unspecified type    2.  Acute upper respiratory infection          DISPOSITION/PLAN     DISPOSITION Decision To Discharge 12/29/2023 10:07:19 PM      PATIENT REFERRED TO:  [facility] care  [phone]  Schedule an appointment as soon as possible for a visit in 2 days        DISCHARGE MEDICATIONS:  New Prescriptions    BROMPHENIRAMINE-PSEUDOEPHEDRINE-DM 2-30-10 MG/5ML SYRUP    Take 5 mLs by mouth 4 times daily as needed for Cough or Congestion    CEFDINIR (OMNICEF) 300 MG CAPSULE    Take 1 capsule by mouth 2 times daily for 10 days    IBUPROFEN (ADVIL;MOTRIN) 600 MG TABLET    Take 1 tablet by mouth 3 times daily as needed for Pain    OFLOXACIN (FLOXIN) 0.3 % OTIC SOLUTION    Place 5 drops into both ears 2 times daily for 10 days       DISCONTINUED MEDICATIONS:  Discontinued Medications    BROMPHENIRAMINE-PSEUDOEPHEDRINE-DM 2-30-10 MG/5ML SYRUP    Take 5 mLs by mouth 4 times daily as needed for Congestion or Cough    IBUPROFEN (ADVIL;MOTRIN) 600 MG TABLET    Take 1 tablet by mouth 4 times daily as needed for Pain    PRENATAL VIT-FE FUMARATE-FA (PRENATAL VITAMINS PO)    Take by mouth              (Please note that portions of this note were completed with a voice recognition program.  Efforts were made to edit the dictations but occasionally words are mis-transcribed.)    TAWANDA Cummins CNP (electronically signed)         TAWANDA Santos CNP  12/29/23 1927

## (undated) DEVICE — BLADE CLIPPER GEN PURP NS

## (undated) DEVICE — ELECTRODE PT RET AD L9FT HI MOIST COND ADH HYDRGEL CORDED

## (undated) DEVICE — DRESSING SUPERABSORBENT W4XL4IN 4 LAYR NONWOVEN FLD

## (undated) DEVICE — SUTURE MCRYL SZ 0 L27IN ABSRB VLT CT-1 L36MM 1/2 CIR TAPR Y340H

## (undated) DEVICE — CESAREAN BIRTH PACK: Brand: MEDLINE INDUSTRIES, INC.

## (undated) DEVICE — SCALPEL SURG NO21 S STL STR W/ INTEGR MTRC RUL DISP

## (undated) DEVICE — TRAY CATH CATH OD16FR 200ML URIN M SIL CNTR ENTRY F

## (undated) DEVICE — LINER,SOFT,SUCTION CANISTER,1500CC: Brand: MEDLINE

## (undated) DEVICE — GLOVE ORANGE PI 7   MSG9070

## (undated) DEVICE — SUTURE STRATAFIX SYMMETRIC SZ 1 L18IN ABSRB VLT CT1 L36CM SXPP1A404

## (undated) DEVICE — 3M™ STERI-STRIP™ ELASTIC SKIN CLOSURES, E4547, 1/2 IN X 4 IN (12 MM X 100 MM), 6 STRIPS/ENVELOPE: Brand: 3M™ STERI-STRIP™

## (undated) DEVICE — GLOVE SURG SZ 65 THK91MIL LTX FREE SYN POLYISOPRENE

## (undated) DEVICE — STAPLER SKIN SQ 30 ABSRB STPL DISP INSORB ORDER VIA PHONE OR EMAIL

## (undated) DEVICE — GOWN,SIRUS,FABRNF,XL,20/CS: Brand: MEDLINE

## (undated) DEVICE — Z INACTIVE NO ACTIVE SUPPLIER APPLICATOR MEDICATED 26 CC TINT HI-LITE ORNG STRL CHLORAPREP

## (undated) DEVICE — MASTISOL ADHESIVE LIQ 2/3ML

## (undated) DEVICE — TELFA ADHESIVE ISLAND DRESSING: Brand: TELFA

## (undated) DEVICE — STANDARD HYPODERMIC NEEDLE,POLYPROPYLENE HUB: Brand: MONOJECT

## (undated) DEVICE — SUTURE VCRL SZ 1 L27IN ABSRB VLT L36MM CT-1 1/2 CIR J341H

## (undated) DEVICE — PAD ABD CURITY TENDERSORB 5X9IN

## (undated) DEVICE — SCALPEL SURG NO10 S STL ABS PLAS HNDL DISPOSABLE

## (undated) DEVICE — TUBE BLD COLLECT ST 1 SIL COAT 7ML 10ML

## (undated) DEVICE — RETRACTOR SURG M-L RNG DIA17CM INCIS 15CM ELAS SELF RET BLNT

## (undated) DEVICE — SLEEVE COMPRESS STD CALF KNEE SCD

## (undated) DEVICE — GLOVE ORANGE PI 7 1/2   MSG9075